# Patient Record
Sex: MALE | Employment: FULL TIME | URBAN - METROPOLITAN AREA
[De-identification: names, ages, dates, MRNs, and addresses within clinical notes are randomized per-mention and may not be internally consistent; named-entity substitution may affect disease eponyms.]

---

## 2017-01-16 ENCOUNTER — MED REC SCAN ONLY (OUTPATIENT)
Dept: FAMILY MEDICINE CLINIC | Facility: CLINIC | Age: 37
End: 2017-01-16

## 2017-02-13 NOTE — PROGRESS NOTES
Heavy smoker wishes to retry Chantix, a drug that he has used successfully in the past.  Wife is not a smoker. Patient thankfully has not had complications of smoking as yet. Denies depressive illnesses.   Simply describes colorful dreams while he was on

## 2017-02-21 ENCOUNTER — TELEPHONE (OUTPATIENT)
Dept: FAMILY MEDICINE CLINIC | Facility: CLINIC | Age: 37
End: 2017-02-21

## 2017-02-22 ENCOUNTER — LAB ENCOUNTER (OUTPATIENT)
Dept: LAB | Age: 37
End: 2017-02-22
Attending: FAMILY MEDICINE
Payer: COMMERCIAL

## 2017-02-22 ENCOUNTER — OFFICE VISIT (OUTPATIENT)
Dept: FAMILY MEDICINE CLINIC | Facility: CLINIC | Age: 37
End: 2017-02-22

## 2017-02-22 ENCOUNTER — MED REC SCAN ONLY (OUTPATIENT)
Dept: FAMILY MEDICINE CLINIC | Facility: CLINIC | Age: 37
End: 2017-02-22

## 2017-02-22 VITALS
HEART RATE: 78 BPM | RESPIRATION RATE: 16 BRPM | BODY MASS INDEX: 30.07 KG/M2 | DIASTOLIC BLOOD PRESSURE: 76 MMHG | TEMPERATURE: 98 F | WEIGHT: 222 LBS | SYSTOLIC BLOOD PRESSURE: 112 MMHG | HEIGHT: 72 IN

## 2017-02-22 DIAGNOSIS — M54.16 LUMBAR RADICULOPATHY: ICD-10-CM

## 2017-02-22 DIAGNOSIS — Z01.818 PRE-OP EXAM: ICD-10-CM

## 2017-02-22 DIAGNOSIS — Z01.818 PRE-OP EXAM: Primary | ICD-10-CM

## 2017-02-22 DIAGNOSIS — Z01.818 PREOP EXAMINATION: Primary | ICD-10-CM

## 2017-02-22 LAB
ALBUMIN SERPL-MCNC: 3.8 G/DL (ref 3.5–4.8)
ALP LIVER SERPL-CCNC: 60 U/L (ref 45–117)
ALT SERPL-CCNC: 25 U/L (ref 17–63)
APTT PPP: 27.7 SECONDS (ref 25–34)
AST SERPL-CCNC: 19 U/L (ref 15–41)
BASOPHILS # BLD AUTO: 0.07 X10(3) UL (ref 0–0.1)
BASOPHILS NFR BLD AUTO: 1.1 %
BILIRUB SERPL-MCNC: 0.5 MG/DL (ref 0.1–2)
BUN BLD-MCNC: 11 MG/DL (ref 8–20)
CALCIUM BLD-MCNC: 9.4 MG/DL (ref 8.3–10.3)
CHLORIDE: 108 MMOL/L (ref 101–111)
CO2: 31 MMOL/L (ref 22–32)
CREAT BLD-MCNC: 0.86 MG/DL (ref 0.7–1.3)
EOSINOPHIL # BLD AUTO: 0.53 X10(3) UL (ref 0–0.3)
EOSINOPHIL NFR BLD AUTO: 8.1 %
ERYTHROCYTE [DISTWIDTH] IN BLOOD BY AUTOMATED COUNT: 12.3 % (ref 11.5–16)
GLUCOSE BLD-MCNC: 87 MG/DL (ref 70–99)
HCT VFR BLD AUTO: 46.8 % (ref 37–53)
HGB BLD-MCNC: 16 G/DL (ref 13–17)
IMMATURE GRANULOCYTE COUNT: 0.01 X10(3) UL (ref 0–1)
IMMATURE GRANULOCYTE RATIO %: 0.2 %
INR BLD: 0.93 (ref 0.89–1.12)
LYMPHOCYTES # BLD AUTO: 1.12 X10(3) UL (ref 0.9–4)
LYMPHOCYTES NFR BLD AUTO: 17.1 %
M PROTEIN MFR SERPL ELPH: 7.2 G/DL (ref 6.1–8.3)
MCH RBC QN AUTO: 33.4 PG (ref 27–33.2)
MCHC RBC AUTO-ENTMCNC: 34.2 G/DL (ref 31–37)
MCV RBC AUTO: 97.7 FL (ref 80–99)
MONOCYTES # BLD AUTO: 0.66 X10(3) UL (ref 0.1–0.6)
MONOCYTES NFR BLD AUTO: 10.1 %
NEUTROPHIL ABS PRELIM: 4.15 X10 (3) UL (ref 1.3–6.7)
NEUTROPHILS # BLD AUTO: 4.15 X10(3) UL (ref 1.3–6.7)
NEUTROPHILS NFR BLD AUTO: 63.4 %
PLATELET # BLD AUTO: 173 10(3)UL (ref 150–450)
POTASSIUM SERPL-SCNC: 4.7 MMOL/L (ref 3.6–5.1)
PSA SERPL DL<=0.01 NG/ML-MCNC: 12.7 SECONDS (ref 12.3–14.8)
RBC # BLD AUTO: 4.79 X10(6)UL (ref 4.3–5.7)
RED CELL DISTRIBUTION WIDTH-SD: 44.3 FL (ref 35.1–46.3)
SODIUM SERPL-SCNC: 142 MMOL/L (ref 136–144)
WBC # BLD AUTO: 6.5 X10(3) UL (ref 4–13)

## 2017-02-22 PROCEDURE — 85025 COMPLETE CBC W/AUTO DIFF WBC: CPT

## 2017-02-22 PROCEDURE — 36415 COLL VENOUS BLD VENIPUNCTURE: CPT

## 2017-02-22 PROCEDURE — 85730 THROMBOPLASTIN TIME PARTIAL: CPT

## 2017-02-22 PROCEDURE — 99243 OFF/OP CNSLTJ NEW/EST LOW 30: CPT | Performed by: FAMILY MEDICINE

## 2017-02-22 PROCEDURE — 85610 PROTHROMBIN TIME: CPT

## 2017-02-22 PROCEDURE — 93000 ELECTROCARDIOGRAM COMPLETE: CPT | Performed by: FAMILY MEDICINE

## 2017-02-22 PROCEDURE — 80053 COMPREHEN METABOLIC PANEL: CPT

## 2017-02-22 NOTE — PROGRESS NOTES
HPI:    Patient ID: Coco Norman is a 42year old male. HPI Comments: Pt is here for preop clearance requested by Dr. Leyla An. He is having microdiscectomy with Dr. Leyla An on 3/3/17. Due to left lumbar radiculopathy.     Past Medical History:    Derm breath sounds normal. No respiratory distress. He has no wheezes. He has no rales. Abdominal: Soft. Bowel sounds are normal. He exhibits no distension. There is no tenderness. Lymphadenopathy:     He has no cervical adenopathy.    Neurological: He is al

## 2017-02-27 ENCOUNTER — TELEPHONE (OUTPATIENT)
Dept: FAMILY MEDICINE CLINIC | Facility: CLINIC | Age: 37
End: 2017-02-27

## 2017-03-14 RX ORDER — VARENICLINE TARTRATE 0.5 (11)-1
KIT ORAL
Qty: 1 PACKAGE | Refills: 0 | Status: SHIPPED | OUTPATIENT
Start: 2017-03-14 | End: 2017-04-12

## 2017-04-01 ENCOUNTER — MED REC SCAN ONLY (OUTPATIENT)
Dept: FAMILY MEDICINE CLINIC | Facility: CLINIC | Age: 37
End: 2017-04-01

## 2017-04-11 RX ORDER — VARENICLINE TARTRATE 0.5 (11)-1
KIT ORAL
Qty: 1 PACKAGE | Refills: 0 | Status: SHIPPED | OUTPATIENT
Start: 2017-04-11 | End: 2018-07-23 | Stop reason: ALTCHOICE

## 2017-04-12 ENCOUNTER — TELEPHONE (OUTPATIENT)
Dept: FAMILY MEDICINE CLINIC | Facility: CLINIC | Age: 37
End: 2017-04-12

## 2017-04-12 RX ORDER — VARENICLINE TARTRATE 1 MG/1
1 TABLET, FILM COATED ORAL 2 TIMES DAILY
Qty: 60 TABLET | Refills: 0 | Status: SHIPPED | OUTPATIENT
Start: 2017-04-12 | End: 2017-05-10

## 2017-04-12 RX ORDER — VARENICLINE TARTRATE 1 MG/1
1 TABLET, FILM COATED ORAL 2 TIMES DAILY
Qty: 60 TABLET | Refills: 0 | Status: SHIPPED | OUTPATIENT
Start: 2017-04-12 | End: 2017-04-12

## 2017-04-12 RX ORDER — VARENICLINE TARTRATE 0.5 (11)-1
KIT ORAL
Qty: 53 TABLET | Refills: 0 | Status: SHIPPED | OUTPATIENT
Start: 2017-04-12 | End: 2017-04-12 | Stop reason: CLARIF

## 2017-05-02 ENCOUNTER — MED REC SCAN ONLY (OUTPATIENT)
Dept: FAMILY MEDICINE CLINIC | Facility: CLINIC | Age: 37
End: 2017-05-02

## 2017-05-04 ENCOUNTER — TELEPHONE (OUTPATIENT)
Dept: FAMILY MEDICINE CLINIC | Facility: CLINIC | Age: 37
End: 2017-05-04

## 2017-05-10 RX ORDER — VARENICLINE TARTRATE 1 MG/1
TABLET, FILM COATED ORAL
Qty: 60 TABLET | Refills: 0 | Status: SHIPPED | OUTPATIENT
Start: 2017-05-10 | End: 2017-06-06

## 2017-06-06 RX ORDER — VARENICLINE TARTRATE 1 MG/1
TABLET, FILM COATED ORAL
Qty: 60 TABLET | Refills: 0 | Status: SHIPPED | OUTPATIENT
Start: 2017-06-06 | End: 2017-07-03

## 2017-07-03 RX ORDER — VARENICLINE TARTRATE 1 MG/1
TABLET, FILM COATED ORAL
Qty: 60 TABLET | Refills: 0 | Status: SHIPPED | OUTPATIENT
Start: 2017-07-03 | End: 2017-08-04

## 2017-08-07 RX ORDER — VARENICLINE TARTRATE 1 MG/1
TABLET, FILM COATED ORAL
Qty: 60 TABLET | Refills: 0 | Status: SHIPPED | OUTPATIENT
Start: 2017-08-07 | End: 2017-09-15

## 2017-09-15 RX ORDER — VARENICLINE TARTRATE 1 MG/1
TABLET, FILM COATED ORAL
Qty: 60 TABLET | Refills: 0 | OUTPATIENT
Start: 2017-09-15

## 2017-09-18 RX ORDER — VARENICLINE TARTRATE 1 MG/1
TABLET, FILM COATED ORAL
Qty: 60 TABLET | Refills: 0 | Status: SHIPPED | OUTPATIENT
Start: 2017-09-18 | End: 2018-04-02

## 2017-09-19 RX ORDER — VARENICLINE TARTRATE 1 MG/1
TABLET, FILM COATED ORAL
Qty: 60 TABLET | Refills: 0
Start: 2017-09-19

## 2017-09-19 NOTE — TELEPHONE ENCOUNTER
From: Carolyn Miranda  Sent: 9/15/2017 8:18 PM CDT  Subject: Medication Renewal Request    Carolyn Miranda would like a refill of the following medications:     CHANTIX 1 MG Oral Tab Jaimie Deshpande DO]    Preferred pharmacy: [address] [place] [address], [address] [place], [place] [address], [phone]    [facility]

## 2017-10-26 RX ORDER — TADALAFIL 20 MG
TABLET ORAL
Qty: 8 TABLET | Refills: 0 | Status: SHIPPED | OUTPATIENT
Start: 2017-10-26 | End: 2017-11-25

## 2017-11-14 ENCOUNTER — TELEPHONE (OUTPATIENT)
Dept: FAMILY MEDICINE CLINIC | Facility: CLINIC | Age: 37
End: 2017-11-14

## 2017-11-27 RX ORDER — TADALAFIL 20 MG
TABLET ORAL
Qty: 8 TABLET | Refills: 0 | Status: SHIPPED | OUTPATIENT
Start: 2017-11-27 | End: 2017-12-26

## 2017-12-27 RX ORDER — TADALAFIL 20 MG
TABLET ORAL
Qty: 8 TABLET | Refills: 0 | Status: SHIPPED | OUTPATIENT
Start: 2017-12-27 | End: 2018-04-20

## 2018-02-28 ENCOUNTER — TELEPHONE (OUTPATIENT)
Dept: FAMILY MEDICINE CLINIC | Facility: CLINIC | Age: 38
End: 2018-02-28

## 2018-02-28 NOTE — TELEPHONE ENCOUNTER
Pt called pharmacy for chantix refill but was advised script # was no good.   [facility] [address] [facility] [place]

## 2018-03-16 ENCOUNTER — TELEPHONE (OUTPATIENT)
Dept: FAMILY MEDICINE CLINIC | Facility: CLINIC | Age: 38
End: 2018-03-16

## 2018-03-16 NOTE — TELEPHONE ENCOUNTER
Medical record request.  Send to [facility]. Med Rec from 11/1/15 to present. Sent to [facility] 3/16/2018. Copy sent to scan.

## 2018-04-02 RX ORDER — VARENICLINE TARTRATE 1 MG/1
TABLET, FILM COATED ORAL
Qty: 60 TABLET | Refills: 0 | Status: SHIPPED | OUTPATIENT
Start: 2018-04-02 | End: 2020-03-26

## 2018-04-21 RX ORDER — TADALAFIL 20 MG
TABLET ORAL
Qty: 8 TABLET | Refills: 0 | Status: SHIPPED | OUTPATIENT
Start: 2018-04-21 | End: 2018-07-23

## 2018-04-21 NOTE — TELEPHONE ENCOUNTER
Last ov was 2/21/18  Last refill Cialis 12/27/17        Pt is due for a f/u on his medication. No further refills will be made without an office visit.  Please call to schedule a f/u

## 2018-05-01 RX ORDER — VARENICLINE TARTRATE 1 MG/1
TABLET, FILM COATED ORAL
Qty: 60 TABLET | Refills: 0 | OUTPATIENT
Start: 2018-05-01

## 2018-05-17 RX ORDER — VARENICLINE TARTRATE 1 MG/1
TABLET, FILM COATED ORAL
Qty: 60 TABLET | Refills: 0 | OUTPATIENT
Start: 2018-05-17

## 2018-06-06 RX ORDER — TADALAFIL 20 MG/1
TABLET ORAL
Qty: 8 TABLET | Refills: 0
Start: 2018-06-06

## 2018-06-06 NOTE — TELEPHONE ENCOUNTER
From: Janet Tineo  Sent: 6/5/2018 6:05 PM CDT  Subject: Medication Renewal Request    Janet Tineo would like a refill of the following medications:     CIALIS 20 MG Oral Tab Tigre Jesus, ]    Preferred pharmacy: [facility] [place] [address]

## 2018-07-23 ENCOUNTER — OFFICE VISIT (OUTPATIENT)
Dept: FAMILY MEDICINE CLINIC | Facility: CLINIC | Age: 38
End: 2018-07-23
Payer: COMMERCIAL

## 2018-07-23 VITALS
HEART RATE: 70 BPM | SYSTOLIC BLOOD PRESSURE: 112 MMHG | TEMPERATURE: 99 F | RESPIRATION RATE: 16 BRPM | WEIGHT: 234 LBS | HEIGHT: 72 IN | BODY MASS INDEX: 31.69 KG/M2 | OXYGEN SATURATION: 97 % | DIASTOLIC BLOOD PRESSURE: 82 MMHG

## 2018-07-23 DIAGNOSIS — Z00.00 ROUTINE GENERAL MEDICAL EXAMINATION AT HEALTH CARE FACILITY: ICD-10-CM

## 2018-07-23 DIAGNOSIS — M81.0 AGE-RELATED OSTEOPOROSIS WITHOUT CURRENT PATHOLOGICAL FRACTURE: Primary | ICD-10-CM

## 2018-07-23 DIAGNOSIS — G35 MULTIPLE SCLEROSIS (HCC): ICD-10-CM

## 2018-07-23 PROCEDURE — 99396 PREV VISIT EST AGE 40-64: CPT | Performed by: FAMILY MEDICINE

## 2018-07-23 RX ORDER — PAROXETINE HYDROCHLORIDE 20 MG/1
20 TABLET, FILM COATED ORAL
COMMUNITY
End: 2019-10-01 | Stop reason: ALTCHOICE

## 2018-07-23 RX ORDER — TADALAFIL 20 MG/1
TABLET ORAL
Qty: 8 TABLET | Refills: 0 | Status: SHIPPED | OUTPATIENT
Start: 2018-07-23 | End: 2018-08-28

## 2018-07-23 RX ORDER — DIAZEPAM 5 MG/1
TABLET ORAL
Refills: 1 | COMMUNITY
Start: 2018-05-21 | End: 2021-02-09

## 2018-07-23 RX ORDER — PANTOPRAZOLE SODIUM 40 MG/1
40 TABLET, DELAYED RELEASE ORAL
COMMUNITY
Start: 2018-07-10 | End: 2019-10-01 | Stop reason: ALTCHOICE

## 2018-07-23 RX ORDER — VENLAFAXINE HYDROCHLORIDE 37.5 MG/1
37.5 CAPSULE, EXTENDED RELEASE ORAL
Refills: 1 | COMMUNITY
Start: 2018-07-17 | End: 2021-02-09 | Stop reason: DRUGHIGH

## 2018-07-23 NOTE — PROGRESS NOTES
Here for physical.  Elsa Tracey was diagnosed with multiple sclerosis at least 5 years ago. He was evaluated recently in the ER for abdominal pain and was found that he had gallbladder sludge.   Surgery was not performed because of recent steroid use and ris again in 4-5 months thank you

## 2018-08-07 ENCOUNTER — APPOINTMENT (OUTPATIENT)
Dept: LAB | Age: 38
End: 2018-08-07
Attending: FAMILY MEDICINE
Payer: COMMERCIAL

## 2018-08-07 ENCOUNTER — HOSPITAL ENCOUNTER (OUTPATIENT)
Dept: BONE DENSITY | Age: 38
Discharge: HOME | End: 2018-08-07
Attending: FAMILY MEDICINE
Payer: COMMERCIAL

## 2018-08-07 DIAGNOSIS — M81.0 AGE-RELATED OSTEOPOROSIS WITHOUT CURRENT PATHOLOGICAL FRACTURE: ICD-10-CM

## 2018-08-07 LAB — VIT D+METAB SERPL-MCNC: 28.7 NG/ML (ref 30–100)

## 2018-08-07 PROCEDURE — 82306 VITAMIN D 25 HYDROXY: CPT | Performed by: FAMILY MEDICINE

## 2018-08-07 PROCEDURE — 36415 COLL VENOUS BLD VENIPUNCTURE: CPT | Performed by: FAMILY MEDICINE

## 2018-08-07 PROCEDURE — 77080 DXA BONE DENSITY AXIAL: CPT | Performed by: FAMILY MEDICINE

## 2018-08-28 RX ORDER — TADALAFIL 20 MG/1
TABLET ORAL
Qty: 8 TABLET | Refills: 0
Start: 2018-08-28

## 2018-08-29 RX ORDER — TADALAFIL 20 MG/1
TABLET ORAL
Qty: 8 TABLET | Refills: 0 | Status: SHIPPED
Start: 2018-08-29 | End: 2018-10-17

## 2018-08-29 NOTE — TELEPHONE ENCOUNTER
From: Kalee Coughlin  Sent: 8/28/2018 6:00 PM CDT  Subject: Medication Renewal Request    Kalee Coughlin would like a refill of the following medications:      Tadalafil (CIALIS) 20 MG Oral Tab Luis Manuel Fatima DO]    Preferred pharmacy: [id number] - [place]

## 2018-10-17 RX ORDER — TADALAFIL 20 MG/1
TABLET ORAL
Qty: 8 TABLET | Refills: 0 | Status: SHIPPED | OUTPATIENT
Start: 2018-10-17 | End: 2018-12-18

## 2018-10-18 RX ORDER — TADALAFIL 20 MG/1
TABLET ORAL
Qty: 8 TABLET | Refills: 0 | OUTPATIENT
Start: 2018-10-18

## 2018-12-18 RX ORDER — TADALAFIL 20 MG/1
TABLET ORAL
Qty: 8 TABLET | Refills: 0 | Status: SHIPPED | OUTPATIENT
Start: 2018-12-18 | End: 2019-07-09

## 2018-12-18 RX ORDER — TADALAFIL 20 MG/1
TABLET ORAL
Qty: 8 TABLET | Refills: 0 | Status: SHIPPED | OUTPATIENT
Start: 2018-12-18 | End: 2019-04-04

## 2019-03-01 ENCOUNTER — TELEPHONE (OUTPATIENT)
Dept: FAMILY MEDICINE CLINIC | Facility: CLINIC | Age: 39
End: 2019-03-01

## 2019-03-01 NOTE — TELEPHONE ENCOUNTER
MEDICAL RECORDS FOR DISABILITY BENEFITS FROM 11/1/15 TO PRESENT SENT TO:    [facility]  [address]  [address]  [place] [address]    REQUEST SENT TO [address] [place] [address] STAT 3/1/19

## 2019-04-04 RX ORDER — TADALAFIL 20 MG/1
TABLET ORAL
Qty: 8 TABLET | Refills: 0 | Status: SHIPPED | OUTPATIENT
Start: 2019-04-04 | End: 2019-07-09

## 2019-04-05 RX ORDER — TADALAFIL 20 MG/1
TABLET ORAL
Qty: 8 TABLET | Refills: 0 | OUTPATIENT
Start: 2019-04-05

## 2019-07-09 RX ORDER — TADALAFIL 20 MG/1
TABLET ORAL
Qty: 8 TABLET | Refills: 0 | Status: SHIPPED | OUTPATIENT
Start: 2019-07-09 | End: 2019-08-19

## 2019-07-09 NOTE — TELEPHONE ENCOUNTER
Last ov 7/23/18  Last refill 4/4/19    . Pt is due for a physical on or after 7/23/19 Please call to schedule one

## 2019-08-19 RX ORDER — TADALAFIL 20 MG/1
TABLET ORAL
Qty: 8 TABLET | Refills: 0 | Status: SHIPPED | OUTPATIENT
Start: 2019-08-19 | End: 2019-10-01

## 2019-08-19 NOTE — TELEPHONE ENCOUNTER
Last visit 7/23/2018  Last refill 07/9/2019    Please call patient and have them make a appt for future refills

## 2019-09-18 RX ORDER — TADALAFIL 20 MG/1
TABLET ORAL
Qty: 8 TABLET | Refills: 0 | OUTPATIENT
Start: 2019-09-18

## 2019-09-23 RX ORDER — TADALAFIL 20 MG/1
TABLET ORAL
Qty: 8 TABLET | Refills: 0 | OUTPATIENT
Start: 2019-09-23

## 2019-10-01 NOTE — PROGRESS NOTES
Here to renew 2 different medications one is Cialis which has been very effective for him the other is to renew Chantix, a medication that has helped in the past.  He has had no other complications and denies history of depression.     Exam mental status is

## 2020-01-28 RX ORDER — TADALAFIL 20 MG/1
TABLET ORAL
Qty: 8 TABLET | Refills: 11 | Status: SHIPPED | OUTPATIENT
Start: 2020-01-28 | End: 2020-09-18

## 2020-03-26 RX ORDER — VARENICLINE TARTRATE 1 MG/1
TABLET, FILM COATED ORAL
Qty: 180 TABLET | Refills: 2 | Status: SHIPPED | OUTPATIENT
Start: 2020-03-26 | End: 2020-09-18

## 2020-07-22 NOTE — TELEPHONE ENCOUNTER
[facility], they have [facility] that is still good, they will call pt. Task completed. What Is The Reason For Today's Visit?: History of Non-Melanoma Skin Cancer How Many Skin Cancers Have You Had?: more than one When Was Your Last Cancer Diagnosed?: 2019

## 2020-09-18 DIAGNOSIS — Z00.00 LABORATORY EXAM ORDERED AS PART OF ROUTINE GENERAL MEDICAL EXAMINATION: Primary | ICD-10-CM

## 2020-09-18 DIAGNOSIS — E55.9 HYPOVITAMINOSIS D: ICD-10-CM

## 2020-09-18 DIAGNOSIS — Z12.5 ENCOUNTER FOR SCREENING FOR MALIGNANT NEOPLASM OF PROSTATE: ICD-10-CM

## 2020-09-18 RX ORDER — TADALAFIL 20 MG/1
TABLET ORAL
Qty: 8 TABLET | Refills: 2 | Status: SHIPPED | OUTPATIENT
Start: 2020-09-18 | End: 2021-02-09

## 2020-09-18 RX ORDER — VARENICLINE TARTRATE 1 MG/1
1 TABLET, FILM COATED ORAL 2 TIMES DAILY
Qty: 180 TABLET | Refills: 0 | Status: SHIPPED | OUTPATIENT
Start: 2020-09-18

## 2020-11-18 RX ORDER — TADALAFIL 20 MG/1
TABLET ORAL
Qty: 8 TABLET | Refills: 2 | OUTPATIENT
Start: 2020-11-18

## 2021-01-21 ENCOUNTER — TELEPHONE (OUTPATIENT)
Dept: FAMILY MEDICINE CLINIC | Facility: CLINIC | Age: 41
End: 2021-01-21

## 2021-02-09 ENCOUNTER — OFFICE VISIT (OUTPATIENT)
Dept: FAMILY MEDICINE CLINIC | Facility: CLINIC | Age: 41
End: 2021-02-09
Payer: MEDICAID

## 2021-02-09 VITALS
RESPIRATION RATE: 18 BRPM | BODY MASS INDEX: 35.49 KG/M2 | DIASTOLIC BLOOD PRESSURE: 88 MMHG | HEIGHT: 72 IN | OXYGEN SATURATION: 96 % | WEIGHT: 262 LBS | SYSTOLIC BLOOD PRESSURE: 132 MMHG | HEART RATE: 78 BPM

## 2021-02-09 DIAGNOSIS — M62.838 MUSCLE SPASM: Primary | ICD-10-CM

## 2021-02-09 DIAGNOSIS — G25.81 RESTLESS LEG: ICD-10-CM

## 2021-02-09 DIAGNOSIS — N52.9 ORGANIC ERECTILE DYSFUNCTION: ICD-10-CM

## 2021-02-09 DIAGNOSIS — M62.838 MUSCLE SPASM: ICD-10-CM

## 2021-02-09 DIAGNOSIS — G35 MULTIPLE SCLEROSIS (HCC): ICD-10-CM

## 2021-02-09 PROCEDURE — 99213 OFFICE O/P EST LOW 20 MIN: CPT | Performed by: FAMILY MEDICINE

## 2021-02-09 PROCEDURE — 3008F BODY MASS INDEX DOCD: CPT | Performed by: FAMILY MEDICINE

## 2021-02-09 PROCEDURE — 3079F DIAST BP 80-89 MM HG: CPT | Performed by: FAMILY MEDICINE

## 2021-02-09 PROCEDURE — 3075F SYST BP GE 130 - 139MM HG: CPT | Performed by: FAMILY MEDICINE

## 2021-02-09 RX ORDER — TADALAFIL 20 MG/1
TABLET ORAL
Qty: 8 TABLET | Refills: 2 | OUTPATIENT
Start: 2021-02-09

## 2021-02-09 RX ORDER — TADALAFIL 20 MG/1
TABLET ORAL
Qty: 8 TABLET | Refills: 2 | Status: SHIPPED | OUTPATIENT
Start: 2021-02-09 | End: 2021-04-21

## 2021-02-09 RX ORDER — DIAZEPAM 5 MG/1
5 TABLET ORAL EVERY 6 HOURS PRN
Qty: 30 TABLET | Refills: 1 | OUTPATIENT
Start: 2021-02-09

## 2021-02-09 RX ORDER — DIAZEPAM 5 MG/1
5 TABLET ORAL EVERY 6 HOURS PRN
Qty: 30 TABLET | Refills: 1 | Status: SHIPPED | OUTPATIENT
Start: 2021-02-09 | End: 2021-03-25

## 2021-02-15 ENCOUNTER — TELEPHONE (OUTPATIENT)
Dept: FAMILY MEDICINE CLINIC | Facility: CLINIC | Age: 41
End: 2021-02-15

## 2021-03-25 DIAGNOSIS — M62.838 MUSCLE SPASM: ICD-10-CM

## 2021-03-25 DIAGNOSIS — G35 MULTIPLE SCLEROSIS (HCC): ICD-10-CM

## 2021-03-25 DIAGNOSIS — G25.81 RESTLESS LEG: ICD-10-CM

## 2021-03-26 RX ORDER — DIAZEPAM 5 MG/1
5 TABLET ORAL EVERY 6 HOURS PRN
Qty: 30 TABLET | Refills: 1 | Status: SHIPPED | OUTPATIENT
Start: 2021-03-26 | End: 2021-11-02

## 2021-04-21 ENCOUNTER — OFFICE VISIT (OUTPATIENT)
Dept: FAMILY MEDICINE CLINIC | Facility: CLINIC | Age: 41
End: 2021-04-21
Payer: MEDICAID

## 2021-04-21 VITALS
SYSTOLIC BLOOD PRESSURE: 128 MMHG | DIASTOLIC BLOOD PRESSURE: 88 MMHG | BODY MASS INDEX: 35.76 KG/M2 | HEIGHT: 72 IN | OXYGEN SATURATION: 98 % | RESPIRATION RATE: 16 BRPM | HEART RATE: 74 BPM | WEIGHT: 264 LBS

## 2021-04-21 DIAGNOSIS — N52.9 ORGANIC ERECTILE DYSFUNCTION: ICD-10-CM

## 2021-04-21 DIAGNOSIS — M21.611 BUNION OF GREAT TOE OF RIGHT FOOT: ICD-10-CM

## 2021-04-21 DIAGNOSIS — M54.42 CHRONIC RIGHT-SIDED LOW BACK PAIN WITH BILATERAL SCIATICA: ICD-10-CM

## 2021-04-21 DIAGNOSIS — M54.41 CHRONIC RIGHT-SIDED LOW BACK PAIN WITH BILATERAL SCIATICA: ICD-10-CM

## 2021-04-21 DIAGNOSIS — R06.83 SNORING: Primary | ICD-10-CM

## 2021-04-21 DIAGNOSIS — G47.19 EXCESSIVE DAYTIME SLEEPINESS: ICD-10-CM

## 2021-04-21 DIAGNOSIS — G89.29 CHRONIC RIGHT-SIDED LOW BACK PAIN WITH BILATERAL SCIATICA: ICD-10-CM

## 2021-04-21 PROCEDURE — 3008F BODY MASS INDEX DOCD: CPT | Performed by: FAMILY MEDICINE

## 2021-04-21 PROCEDURE — 3079F DIAST BP 80-89 MM HG: CPT | Performed by: FAMILY MEDICINE

## 2021-04-21 PROCEDURE — 99214 OFFICE O/P EST MOD 30 MIN: CPT | Performed by: FAMILY MEDICINE

## 2021-04-21 PROCEDURE — 3074F SYST BP LT 130 MM HG: CPT | Performed by: FAMILY MEDICINE

## 2021-04-21 RX ORDER — TADALAFIL 20 MG/1
TABLET ORAL
Qty: 30 TABLET | Refills: 2 | Status: SHIPPED | OUTPATIENT
Start: 2021-04-21 | End: 2021-09-10

## 2021-04-21 NOTE — PROGRESS NOTES
[facility] Progress Note    SUBJECTIVE: Sanju Mejia 35year old male is here today for Patient presents with:  Obstructive Sleep Apnea (VITA): snoring  Foot Pain: Rt foot x 1 year, getting worse      Snoring, has been snoring for quite a while, ha • PARoxetine HCl 30 MG Oral Tab Take 30 mg by mouth every morning. • Ocrelizumab (OCREVUS IV) Inject into the vein.      • AUBAGIO 14 MG Oral Tab      • Varenicline Tartrate (CHANTIX) 1 MG Oral Tab Take 1 tablet (1 mg total) by mouth 2 (two) times nasir

## 2021-05-04 ENCOUNTER — OFFICE VISIT (OUTPATIENT)
Dept: ORTHOPEDICS CLINIC | Facility: CLINIC | Age: 41
End: 2021-05-04
Payer: MEDICAID

## 2021-05-04 ENCOUNTER — HOSPITAL ENCOUNTER (OUTPATIENT)
Dept: GENERAL RADIOLOGY | Age: 41
Discharge: HOME | End: 2021-05-04
Attending: PODIATRIST
Payer: MEDICAID

## 2021-05-04 DIAGNOSIS — R26.9 GAIT ABNORMALITY: ICD-10-CM

## 2021-05-04 DIAGNOSIS — M20.21 HALLUX RIGIDUS OF RIGHT FOOT: Primary | ICD-10-CM

## 2021-05-04 DIAGNOSIS — M21.611 BUNION OF RIGHT FOOT: ICD-10-CM

## 2021-05-04 PROCEDURE — 73630 X-RAY EXAM OF FOOT: CPT | Performed by: PODIATRIST

## 2021-05-04 PROCEDURE — 99203 OFFICE O/P NEW LOW 30 MIN: CPT | Performed by: PODIATRIST

## 2021-05-04 NOTE — PROGRESS NOTES
[facility] New Patient Note    CC: Patient presents with: Foot Pain: Patient is here today for right foot bunion. Patient states that the pain has increased the last few years.       HPI: The patient is a 35year old male who presents today wit except as mentioned above. Physical Exam:    There were no vitals taken for this visit. Neurovascular status is intact distally. Foot swelling, enlarged right first mpj, loss of motion with abundant spurring and palpable tenderness. Leanora Dunker   He only has abou

## 2021-05-05 ENCOUNTER — TELEPHONE (OUTPATIENT)
Dept: ORTHOPEDICS CLINIC | Facility: CLINIC | Age: 41
End: 2021-05-05

## 2021-05-05 NOTE — TELEPHONE ENCOUNTER
Medicaid does not cover DME. Patient will be responsible for 855.00 for orthotics through.  Please schedule and notify patient of 855.00 at time of appointment

## 2021-05-12 ENCOUNTER — OFFICE VISIT (OUTPATIENT)
Dept: SLEEP CENTER | Age: 41
End: 2021-05-12
Attending: INTERNAL MEDICINE
Payer: MEDICAID

## 2021-05-12 DIAGNOSIS — G47.19 EXCESSIVE DAYTIME SLEEPINESS: ICD-10-CM

## 2021-05-12 DIAGNOSIS — R06.83 SNORING: ICD-10-CM

## 2021-05-12 PROCEDURE — 95810 POLYSOM 6/> YRS 4/> PARAM: CPT

## 2021-06-30 ENCOUNTER — TELEPHONE (OUTPATIENT)
Dept: ORTHOPEDICS CLINIC | Facility: CLINIC | Age: 41
End: 2021-06-30

## 2021-06-30 NOTE — TELEPHONE ENCOUNTER
Left message for pt to call back to update insurance. Checked insurance on [facility] and he is ineligible. Please update and route back to supervisor when complete.

## 2021-07-02 NOTE — TELEPHONE ENCOUNTER
LMOM asking patient to contact our office back to obtain new insurance information. [employer] medicaid is coming back as ineligible.

## 2021-07-06 ENCOUNTER — TELEPHONE (OUTPATIENT)
Dept: ORTHOPEDICS CLINIC | Facility: CLINIC | Age: 41
End: 2021-07-06

## 2021-07-06 NOTE — TELEPHONE ENCOUNTER
Patient left a message on [employer] on Saturday, 7/3/21 at 12pm returning your call regarding insurance. Please call him back.

## 2021-07-07 NOTE — TELEPHONE ENCOUNTER
Patient returned call, gave me new state medicaid insurance. Unable to verify due to [employer] stating \"Request cannot be processed. Try again later \". Insurance entered.

## 2021-07-16 ENCOUNTER — ORDER TRANSCRIPTION (OUTPATIENT)
Dept: SLEEP CENTER | Age: 41
End: 2021-07-16

## 2021-07-16 DIAGNOSIS — Z01.818 PREOP EXAMINATION: Primary | ICD-10-CM

## 2021-07-16 DIAGNOSIS — Z11.59 SCREENING FOR VIRAL DISEASE: ICD-10-CM

## 2021-07-20 ENCOUNTER — LAB ENCOUNTER (OUTPATIENT)
Dept: LAB | Age: 41
End: 2021-07-20
Attending: INTERNAL MEDICINE
Payer: MEDICAID

## 2021-07-20 DIAGNOSIS — Z01.818 PREOP EXAMINATION: ICD-10-CM

## 2021-07-20 DIAGNOSIS — Z11.59 SCREENING FOR VIRAL DISEASE: ICD-10-CM

## 2021-07-21 LAB — SARS-COV-2 RNA RESP QL NAA+PROBE: NOT DETECTED

## 2021-08-11 ENCOUNTER — LAB ENCOUNTER (OUTPATIENT)
Dept: LAB | Age: 41
End: 2021-08-11
Attending: INTERNAL MEDICINE
Payer: MEDICAID

## 2021-08-11 DIAGNOSIS — Z11.59 SCREENING FOR VIRAL DISEASE: ICD-10-CM

## 2021-08-11 DIAGNOSIS — Z01.818 PREOP EXAMINATION: ICD-10-CM

## 2021-08-12 LAB — SARS-COV-2 RNA RESP QL NAA+PROBE: NOT DETECTED

## 2021-08-14 ENCOUNTER — OFFICE VISIT (OUTPATIENT)
Dept: SLEEP CENTER | Age: 41
End: 2021-08-14
Attending: INTERNAL MEDICINE
Payer: MEDICAID

## 2021-08-14 DIAGNOSIS — G47.33 OSA (OBSTRUCTIVE SLEEP APNEA): ICD-10-CM

## 2021-08-14 PROCEDURE — 95811 POLYSOM 6/>YRS CPAP 4/> PARM: CPT

## 2021-09-10 DIAGNOSIS — N52.9 ORGANIC ERECTILE DYSFUNCTION: ICD-10-CM

## 2021-09-10 RX ORDER — TADALAFIL 20 MG/1
TABLET ORAL
Qty: 30 TABLET | Refills: 2 | Status: SHIPPED | OUTPATIENT
Start: 2021-09-10 | End: 2021-11-01

## 2021-11-01 DIAGNOSIS — N52.9 ORGANIC ERECTILE DYSFUNCTION: ICD-10-CM

## 2021-11-01 RX ORDER — TADALAFIL 20 MG/1
TABLET ORAL
Qty: 30 TABLET | Refills: 2 | Status: SHIPPED | OUTPATIENT
Start: 2021-11-01 | End: 2021-12-26

## 2021-11-02 DIAGNOSIS — G35 MULTIPLE SCLEROSIS (HCC): ICD-10-CM

## 2021-11-02 DIAGNOSIS — M62.838 MUSCLE SPASM: ICD-10-CM

## 2021-11-02 DIAGNOSIS — G25.81 RESTLESS LEG: ICD-10-CM

## 2021-11-02 RX ORDER — DIAZEPAM 5 MG/1
5 TABLET ORAL EVERY 6 HOURS PRN
Qty: 30 TABLET | Refills: 1 | Status: SHIPPED | OUTPATIENT
Start: 2021-11-02 | End: 2021-12-26

## 2021-12-26 DIAGNOSIS — G25.81 RESTLESS LEG: ICD-10-CM

## 2021-12-26 DIAGNOSIS — M62.838 MUSCLE SPASM: ICD-10-CM

## 2021-12-26 DIAGNOSIS — N52.9 ORGANIC ERECTILE DYSFUNCTION: ICD-10-CM

## 2021-12-26 DIAGNOSIS — G35 MULTIPLE SCLEROSIS (HCC): ICD-10-CM

## 2021-12-27 RX ORDER — DIAZEPAM 5 MG/1
5 TABLET ORAL EVERY 6 HOURS PRN
Qty: 30 TABLET | Refills: 1 | Status: SHIPPED | OUTPATIENT
Start: 2021-12-27

## 2021-12-27 RX ORDER — TADALAFIL 20 MG/1
TABLET ORAL
Qty: 30 TABLET | Refills: 2 | Status: SHIPPED | OUTPATIENT
Start: 2021-12-27

## 2022-03-01 RX ORDER — DIAZEPAM 5 MG/1
5 TABLET ORAL EVERY 6 HOURS PRN
Qty: 30 TABLET | Refills: 1 | Status: SHIPPED | OUTPATIENT
Start: 2022-03-01

## 2022-04-07 ENCOUNTER — HOSPITAL ENCOUNTER (OUTPATIENT)
Dept: MRI IMAGING | Age: 42
Discharge: HOME | End: 2022-04-07
Attending: SPECIALIST
Payer: COMMERCIAL

## 2022-04-07 DIAGNOSIS — G35 MULTIPLE SCLEROSIS (HCC): ICD-10-CM

## 2022-04-07 DIAGNOSIS — M48.02 CERVICAL SPINAL STENOSIS: ICD-10-CM

## 2022-04-07 PROCEDURE — A9575 INJ GADOTERATE MEGLUMI 0.1ML: HCPCS

## 2022-04-07 PROCEDURE — 70553 MRI BRAIN STEM W/O & W/DYE: CPT | Performed by: SPECIALIST

## 2022-04-07 PROCEDURE — 72156 MRI NECK SPINE W/O & W/DYE: CPT | Performed by: SPECIALIST

## 2022-04-07 RX ORDER — DIAZEPAM 5 MG/1
5 TABLET ORAL EVERY 6 HOURS PRN
Qty: 30 TABLET | Refills: 1 | Status: SHIPPED | OUTPATIENT
Start: 2022-04-07

## 2022-04-18 ENCOUNTER — TELEPHONE (OUTPATIENT)
Dept: FAMILY MEDICINE CLINIC | Facility: CLINIC | Age: 42
End: 2022-04-18

## 2022-04-18 NOTE — TELEPHONE ENCOUNTER
Pt's insurance is updated ([address] [place]).   Pt asking for referral asap.  pls advise

## 2022-04-18 NOTE — TELEPHONE ENCOUNTER
Pt no longer with Medicaid, pt will update his insurance on mychart, info given for Dr. Angie Weston. If referral needed, will need to enter after he updates insurance.  Pt will let us know

## 2022-04-18 NOTE — TELEPHONE ENCOUNTER
Pt was seen in immediate care yesterday. He has a hernia that he needs surgery in 2-3 days. He wants to know who  recommends.   He has [facility]

## 2022-04-21 ENCOUNTER — OFFICE VISIT (OUTPATIENT)
Dept: SURGERY | Facility: CLINIC | Age: 42
End: 2022-04-21
Payer: COMMERCIAL

## 2022-04-21 VITALS
TEMPERATURE: 97 F | DIASTOLIC BLOOD PRESSURE: 84 MMHG | BODY MASS INDEX: 33.13 KG/M2 | WEIGHT: 250 LBS | SYSTOLIC BLOOD PRESSURE: 124 MMHG | HEIGHT: 73 IN | HEART RATE: 69 BPM

## 2022-04-21 DIAGNOSIS — K59.01 SLOW TRANSIT CONSTIPATION: ICD-10-CM

## 2022-04-21 DIAGNOSIS — G47.33 OSA (OBSTRUCTIVE SLEEP APNEA): ICD-10-CM

## 2022-04-21 DIAGNOSIS — K42.0 UMBILICAL HERNIA, INCARCERATED: ICD-10-CM

## 2022-04-21 DIAGNOSIS — K40.90 UNILATERAL INGUINAL HERNIA WITHOUT OBSTRUCTION OR GANGRENE, RECURRENCE NOT SPECIFIED: Primary | ICD-10-CM

## 2022-04-21 PROCEDURE — 3008F BODY MASS INDEX DOCD: CPT | Performed by: COLON & RECTAL SURGERY

## 2022-04-21 PROCEDURE — 3079F DIAST BP 80-89 MM HG: CPT | Performed by: COLON & RECTAL SURGERY

## 2022-04-21 PROCEDURE — 3074F SYST BP LT 130 MM HG: CPT | Performed by: COLON & RECTAL SURGERY

## 2022-04-21 PROCEDURE — 99244 OFF/OP CNSLTJ NEW/EST MOD 40: CPT | Performed by: COLON & RECTAL SURGERY

## 2022-04-21 RX ORDER — GABAPENTIN 300 MG/1
300 CAPSULE ORAL 3 TIMES DAILY
COMMUNITY

## 2022-04-22 PROBLEM — K59.01 SLOW TRANSIT CONSTIPATION: Status: ACTIVE | Noted: 2022-04-22

## 2022-04-22 PROBLEM — K42.0 UMBILICAL HERNIA, INCARCERATED: Status: ACTIVE | Noted: 2022-04-22

## 2022-04-22 PROBLEM — K40.90 UNILATERAL INGUINAL HERNIA WITHOUT OBSTRUCTION OR GANGRENE: Status: ACTIVE | Noted: 2022-04-22

## 2022-04-22 NOTE — PATIENT INSTRUCTIONS
This patient presents with an umbilical hernia that is becoming significantly symptomatic. He gets 5/10 pain. It comes and goes. It is more noticeable with exercise and movements. He definitely sees a bulge. The pain is intermittent. It is described both as burning and stabbing. He does not have a specific event that brought about these problems. He has no difficulties with urinating. He does feel a change in bowel habits with some obstruction to defecation. He states he is constipated about half the time. He has no associated abdominal pain away from the hernia. He has no nausea or vomiting. He has had no developmental abnormalities of the genitalia. Both testes are present and descended. He has never had prostate cancer, prostate radiation, or any therapy to the prostate gland. He is not on any blood thinners. He has never had heart attack, stroke, heart murmur, heart valve replacement, or cardiac arrhythmia. He does have obstructive sleep apnea and wears a CPAP. The patient is disabled with multiple sclerosis for the last 14 years. He also has problems with cervical stenosis and L4-L5 back problems. It has been greater than 10 years since his last colonoscopy. Clinical exam reveals him to have a very large incarcerated umbilical hernia. The hernia sac is 4.5 cm, the internal opening is not palpable. Liver and spleen are not palpable. BMI is 32.98. There are no incisions on the abdomen. The umbilical hernia is tender on compression. There is no guarding or rebound. There is no overlying cellulitis or erythema. Both testes are present and descended. There is a moderate sized right inguinal hernia. There is no left inguinal hernia. There are no extra cord structures. Examinations were performed both flat and standing with Valsalva. This patient will require colonoscopy based on his change in bowel habits with new onset of constipation.     This patient will require a laparoscopic right inguinal herniorrhaphy with mesh. Repair of his incarcerated umbilical hernia. Had a full conversation with the patient in great detail about all the above. All risks, benefits, complications and alternatives to the proposed procedure(s) were fully discussed with the patient. All questions from the patient were answered in detail. A description of the procedure(s) and possible outcomes was fully discussed. The patient seemed to understand the conversation and its details. Consent for the procedure(s) was confirmed with the patient. We discussed the possible exacerbation of MS with advanced surgical procedures. This would be a mild to moderate procedure, and is not usually associated with exacerbation of MS.

## 2022-04-25 ENCOUNTER — TELEPHONE (OUTPATIENT)
Dept: SURGERY | Facility: CLINIC | Age: 42
End: 2022-04-25

## 2022-04-26 ENCOUNTER — LAB ENCOUNTER (OUTPATIENT)
Dept: LAB | Facility: HOSPITAL | Age: 42
End: 2022-04-26
Attending: COLON & RECTAL SURGERY
Payer: COMMERCIAL

## 2022-04-26 DIAGNOSIS — K40.90 UNILATERAL INGUINAL HERNIA WITHOUT OBSTRUCTION OR GANGRENE, RECURRENCE NOT SPECIFIED: ICD-10-CM

## 2022-04-26 LAB — SARS-COV-2 RNA RESP QL NAA+PROBE: NOT DETECTED

## 2022-04-29 ENCOUNTER — HOSPITAL ENCOUNTER (OUTPATIENT)
Facility: HOSPITAL | Age: 42
Setting detail: HOSPITAL OUTPATIENT SURGERY
Discharge: HOME | End: 2022-04-29
Attending: COLON & RECTAL SURGERY | Admitting: COLON & RECTAL SURGERY
Payer: COMMERCIAL

## 2022-04-29 ENCOUNTER — ANESTHESIA (OUTPATIENT)
Dept: SURGERY | Facility: HOSPITAL | Age: 42
End: 2022-04-29
Payer: COMMERCIAL

## 2022-04-29 ENCOUNTER — ANESTHESIA EVENT (OUTPATIENT)
Dept: SURGERY | Facility: HOSPITAL | Age: 42
End: 2022-04-29
Payer: COMMERCIAL

## 2022-04-29 VITALS
SYSTOLIC BLOOD PRESSURE: 139 MMHG | OXYGEN SATURATION: 92 % | BODY MASS INDEX: 33.8 KG/M2 | RESPIRATION RATE: 18 BRPM | DIASTOLIC BLOOD PRESSURE: 88 MMHG | TEMPERATURE: 98 F | HEART RATE: 87 BPM | HEIGHT: 73 IN | WEIGHT: 255 LBS

## 2022-04-29 DIAGNOSIS — K40.90 UNILATERAL INGUINAL HERNIA WITHOUT OBSTRUCTION OR GANGRENE, RECURRENCE NOT SPECIFIED: Primary | ICD-10-CM

## 2022-04-29 PROCEDURE — 0YU54JZ SUPPLEMENT RIGHT INGUINAL REGION WITH SYNTHETIC SUBSTITUTE, PERCUTANEOUS ENDOSCOPIC APPROACH: ICD-10-PCS | Performed by: COLON & RECTAL SURGERY

## 2022-04-29 PROCEDURE — 0WQF0ZZ REPAIR ABDOMINAL WALL, OPEN APPROACH: ICD-10-PCS | Performed by: COLON & RECTAL SURGERY

## 2022-04-29 DEVICE — BARD MESH
Type: IMPLANTABLE DEVICE | Site: INGUINAL | Status: FUNCTIONAL
Brand: BARD MESH

## 2022-04-29 RX ORDER — ONDANSETRON 2 MG/ML
INJECTION INTRAMUSCULAR; INTRAVENOUS AS NEEDED
Status: DISCONTINUED | OUTPATIENT
Start: 2022-04-29 | End: 2022-04-29 | Stop reason: SURG

## 2022-04-29 RX ORDER — NALOXONE HYDROCHLORIDE 0.4 MG/ML
80 INJECTION, SOLUTION INTRAMUSCULAR; INTRAVENOUS; SUBCUTANEOUS AS NEEDED
Status: DISCONTINUED | OUTPATIENT
Start: 2022-04-29 | End: 2022-04-29

## 2022-04-29 RX ORDER — SODIUM CHLORIDE, SODIUM LACTATE, POTASSIUM CHLORIDE, CALCIUM CHLORIDE 600; 310; 30; 20 MG/100ML; MG/100ML; MG/100ML; MG/100ML
INJECTION, SOLUTION INTRAVENOUS CONTINUOUS
Status: DISCONTINUED | OUTPATIENT
Start: 2022-04-29 | End: 2022-04-29

## 2022-04-29 RX ORDER — NEOSTIGMINE METHYLSULFATE 1 MG/ML
INJECTION INTRAVENOUS AS NEEDED
Status: DISCONTINUED | OUTPATIENT
Start: 2022-04-29 | End: 2022-04-29 | Stop reason: SURG

## 2022-04-29 RX ORDER — HYDROCODONE BITARTRATE AND ACETAMINOPHEN 5; 325 MG/1; MG/1
1 TABLET ORAL AS NEEDED
Status: DISCONTINUED | OUTPATIENT
Start: 2022-04-29 | End: 2022-04-29

## 2022-04-29 RX ORDER — HYDROMORPHONE HYDROCHLORIDE 1 MG/ML
0.4 INJECTION, SOLUTION INTRAMUSCULAR; INTRAVENOUS; SUBCUTANEOUS EVERY 5 MIN PRN
Status: DISCONTINUED | OUTPATIENT
Start: 2022-04-29 | End: 2022-04-29

## 2022-04-29 RX ORDER — HYDROCODONE BITARTRATE AND ACETAMINOPHEN 5; 325 MG/1; MG/1
1-2 TABLET ORAL EVERY 4 HOURS PRN
Qty: 25 TABLET | Refills: 0 | Status: SHIPPED | OUTPATIENT
Start: 2022-04-29

## 2022-04-29 RX ORDER — GLYCOPYRROLATE 0.2 MG/ML
INJECTION, SOLUTION INTRAMUSCULAR; INTRAVENOUS AS NEEDED
Status: DISCONTINUED | OUTPATIENT
Start: 2022-04-29 | End: 2022-04-29 | Stop reason: SURG

## 2022-04-29 RX ORDER — DEXAMETHASONE SODIUM PHOSPHATE 4 MG/ML
VIAL (ML) INJECTION AS NEEDED
Status: DISCONTINUED | OUTPATIENT
Start: 2022-04-29 | End: 2022-04-29 | Stop reason: SURG

## 2022-04-29 RX ORDER — ROCURONIUM BROMIDE 10 MG/ML
INJECTION, SOLUTION INTRAVENOUS AS NEEDED
Status: DISCONTINUED | OUTPATIENT
Start: 2022-04-29 | End: 2022-04-29 | Stop reason: SURG

## 2022-04-29 RX ORDER — LIDOCAINE HYDROCHLORIDE 10 MG/ML
INJECTION, SOLUTION EPIDURAL; INFILTRATION; INTRACAUDAL; PERINEURAL AS NEEDED
Status: DISCONTINUED | OUTPATIENT
Start: 2022-04-29 | End: 2022-04-29 | Stop reason: SURG

## 2022-04-29 RX ORDER — ONDANSETRON 2 MG/ML
4 INJECTION INTRAMUSCULAR; INTRAVENOUS AS NEEDED
Status: DISCONTINUED | OUTPATIENT
Start: 2022-04-29 | End: 2022-04-29

## 2022-04-29 RX ORDER — METOCLOPRAMIDE HYDROCHLORIDE 5 MG/ML
10 INJECTION INTRAMUSCULAR; INTRAVENOUS AS NEEDED
Status: DISCONTINUED | OUTPATIENT
Start: 2022-04-29 | End: 2022-04-29

## 2022-04-29 RX ORDER — HYDROMORPHONE HYDROCHLORIDE 1 MG/ML
INJECTION, SOLUTION INTRAMUSCULAR; INTRAVENOUS; SUBCUTANEOUS
Status: COMPLETED
Start: 2022-04-29 | End: 2022-04-29

## 2022-04-29 RX ORDER — BUPIVACAINE HYDROCHLORIDE AND EPINEPHRINE 5; 5 MG/ML; UG/ML
INJECTION, SOLUTION EPIDURAL; INTRACAUDAL; PERINEURAL AS NEEDED
Status: DISCONTINUED | OUTPATIENT
Start: 2022-04-29 | End: 2022-04-29 | Stop reason: HOSPADM

## 2022-04-29 RX ORDER — HYDROCODONE BITARTRATE AND ACETAMINOPHEN 5; 325 MG/1; MG/1
2 TABLET ORAL AS NEEDED
Status: DISCONTINUED | OUTPATIENT
Start: 2022-04-29 | End: 2022-04-29

## 2022-04-29 RX ORDER — MIDAZOLAM HYDROCHLORIDE 1 MG/ML
INJECTION INTRAMUSCULAR; INTRAVENOUS AS NEEDED
Status: DISCONTINUED | OUTPATIENT
Start: 2022-04-29 | End: 2022-04-29 | Stop reason: SURG

## 2022-04-29 RX ORDER — ACETAMINOPHEN 500 MG
1000 TABLET ORAL ONCE
Status: DISCONTINUED | OUTPATIENT
Start: 2022-04-29 | End: 2022-04-29 | Stop reason: HOSPADM

## 2022-04-29 RX ORDER — KETOROLAC TROMETHAMINE 30 MG/ML
INJECTION, SOLUTION INTRAMUSCULAR; INTRAVENOUS AS NEEDED
Status: DISCONTINUED | OUTPATIENT
Start: 2022-04-29 | End: 2022-04-29 | Stop reason: SURG

## 2022-04-29 RX ORDER — HEPARIN SODIUM 5000 [USP'U]/ML
5000 INJECTION, SOLUTION INTRAVENOUS; SUBCUTANEOUS ONCE
Status: COMPLETED | OUTPATIENT
Start: 2022-04-29 | End: 2022-04-29

## 2022-04-29 RX ORDER — CEFAZOLIN SODIUM/WATER 2 G/20 ML
2 SYRINGE (ML) INTRAVENOUS ONCE
Status: COMPLETED | OUTPATIENT
Start: 2022-04-29 | End: 2022-04-29

## 2022-04-29 RX ADMIN — CEFAZOLIN SODIUM/WATER 2 G: 2 G/20 ML SYRINGE (ML) INTRAVENOUS at 15:41:00

## 2022-04-29 RX ADMIN — ONDANSETRON 4 MG: 2 INJECTION INTRAMUSCULAR; INTRAVENOUS at 15:50:00

## 2022-04-29 RX ADMIN — NEOSTIGMINE METHYLSULFATE 3 MG: 1 INJECTION INTRAVENOUS at 16:37:00

## 2022-04-29 RX ADMIN — LIDOCAINE HYDROCHLORIDE 50 MG: 10 INJECTION, SOLUTION EPIDURAL; INFILTRATION; INTRACAUDAL; PERINEURAL at 15:38:00

## 2022-04-29 RX ADMIN — KETOROLAC TROMETHAMINE 30 MG: 30 INJECTION, SOLUTION INTRAMUSCULAR; INTRAVENOUS at 16:35:00

## 2022-04-29 RX ADMIN — ROCURONIUM BROMIDE 50 MG: 10 INJECTION, SOLUTION INTRAVENOUS at 15:41:00

## 2022-04-29 RX ADMIN — GLYCOPYRROLATE 0.4 MG: 0.2 INJECTION, SOLUTION INTRAMUSCULAR; INTRAVENOUS at 16:37:00

## 2022-04-29 RX ADMIN — MIDAZOLAM HYDROCHLORIDE 2 MG: 1 INJECTION INTRAMUSCULAR; INTRAVENOUS at 15:38:00

## 2022-04-29 RX ADMIN — DEXAMETHASONE SODIUM PHOSPHATE 4 MG: 4 MG/ML VIAL (ML) INJECTION at 15:50:00

## 2022-04-29 NOTE — OPERATIVE REPORT
[facility]  Operative Note    [facility] Eric Esparza Location: [facility] [id number] MRN [id number]   Admission Date 4/29/2022 Operation Date 4/29/2022   Attending Physician Holly Severe, MD Operating Physician Ellis Sharma MD     Pre-Operative Diagnosis: Unilateral inguinal hernia without obstruction or gangrene, recurrence not specified [F69.74] umbilical hernia incarcerated    Post-Operative Diagnosis: Direct right inguinal hernia, bladder contents; incarcerated umbilical hernia, preperitoneal fat and omentum contents    Procedure Performed:  Laparoscopic right inguinal herniorraphy with mesh. Umbilical herniorrhaphy. Surgeon(s) and Role:     * Holly Severe, MD - Primary  Assistant: Shannan Vee PA-C,  The assistance of the physician assistant was absolutely essential to the proper conduct of this case and further assisted with exact proper positioning of the mesh during tac application and complex dissection within the groin structures and anatomy. Anesthesia: General    History of Present Illness:  Very large umbilical hernia, symptomatic, incarcerated. Moderate size right inguinal hernia, reducible      Operative Findings: This patient was found to have a very large right direct inguinal hernia 20 to 30% of the bladder is involved  Umbilical hernia, omentum and preperitoneal fat incarcerated, 2 cm fascial defect closed with interrupted suture of #1 Ethibond  Diagnostic laparoscopy revealed no significant other findings  Liver within normal limits  No significant adhesions  The patient was delivered to recovery room in stable condition      Description of Procedure: Following adequate general anesthesia, the patient was placed in the supine position on the operating room table. The abdomen was prepped with Chlorhexidine. Sterile drapes were placed with wide exposure of the abdomen from xiphoid to pubis. A supraumbilical incision was made.   The hernia sac at the umbilicus was identified and dissected free from surrounding structures. The hernia sac and contents were severed and discarded. Stay sutures were placed to the right and left of the fascial defect at the umbilicus. An Allan trocar was placed in the fascial defect at the umbilicus. The abdomen was insufflated with CO2. Diagnostic laparoscopy was performed. The above-listed findings were noted. Two other trocars were placed in a symmetric fashion at the level of the umbilicus lateral to the rectus sheath. Using the above-listed trocars, we were able to dissect the pelvic floor in the following fashion. A transverse incision was made over the side of the hernia extending from lateral to medial, exposing the pelvic floor structures. Careful dissection was used to identify the pubic tubercle, Patrick's ligament, the iliopubic tract, and the posterior rectus fascia. A Marlex mesh graft was fashioned to fit the pelvic floor. It was secured in place with a hernia tacker. It was fixed medially to the pubic tubercle, posteriorly to Patrick's ligament, laterally to the iliopubic tract, and anteriorly to the posterior rectus fascia. The peritoneum was then closed over the hernia repair and Marlex mesh using the hernia tacker. All trocars were removed under direct visualization with a laparoscope after hemostasis was assured at all operative sites. All laparoscopy equipment was removed and accounted for. The CO2 was suctioned from the abdominal cavity. The umbilical hernia was repaired in a transverse fashion with interrupted 0 Ethibond suture at the level of the fascia. The umbilicus was reinserted back down to the anterior fascia with interrupted 3-0 Vicryl. The umbilical incision was closed with running 5-0 undyed Vicryl in a subcuticular fashion. The port sites were closed at the skin level with 5-0 undyed Vicryl in a subcuticular fashion. Steri-Strips were placed over benzoin adhesive.   Sterile dressings were placed. Marcaine 0.5% with epinephrine was injected into the wound margins. The patient was delivered to the recovery room in stable postoperative condition.      Complications:  none    EBL: 5cc    Path: none    Jazmine Nath MD  4/1/2015  10:30 AM

## 2022-04-29 NOTE — INTERVAL H&P NOTE
Pre-op Diagnosis: Unilateral inguinal hernia without obstruction or gangrene, recurrence not specified [K40.90]    The above referenced H&P was reviewed by Maddy Brown MD on 4/29/2022, the patient was examined and no significant changes have occurred in the patient's condition since the H&P was performed. I discussed with the patient and/or legal representative the potential benefits, risks and side effects of this procedure; the likelihood of the patient achieving goals; and potential problems that might occur during recuperation. I discussed reasonable alternatives to the procedure, including risks, benefits and side effects related to the alternatives and risks related to not receiving this procedure. We will proceed with procedure as planned.

## 2022-05-03 RX ORDER — VENLAFAXINE HYDROCHLORIDE 75 MG/1
75 CAPSULE, EXTENDED RELEASE ORAL
Qty: 30 CAPSULE | Refills: 0 | Status: SHIPPED | OUTPATIENT
Start: 2022-05-03

## 2022-05-16 ENCOUNTER — OFFICE VISIT (OUTPATIENT)
Dept: SURGERY | Facility: CLINIC | Age: 42
End: 2022-05-16

## 2022-05-16 VITALS
TEMPERATURE: 97 F | HEART RATE: 81 BPM | HEIGHT: 73 IN | SYSTOLIC BLOOD PRESSURE: 128 MMHG | DIASTOLIC BLOOD PRESSURE: 87 MMHG | WEIGHT: 255 LBS | BODY MASS INDEX: 33.8 KG/M2

## 2022-05-16 DIAGNOSIS — K42.0 UMBILICAL HERNIA, INCARCERATED: ICD-10-CM

## 2022-05-16 DIAGNOSIS — K40.90 UNILATERAL INGUINAL HERNIA WITHOUT OBSTRUCTION OR GANGRENE, RECURRENCE NOT SPECIFIED: Primary | ICD-10-CM

## 2022-05-16 PROCEDURE — 3074F SYST BP LT 130 MM HG: CPT

## 2022-05-16 PROCEDURE — 99024 POSTOP FOLLOW-UP VISIT: CPT

## 2022-05-16 PROCEDURE — 3008F BODY MASS INDEX DOCD: CPT

## 2022-05-16 PROCEDURE — 3079F DIAST BP 80-89 MM HG: CPT

## 2022-05-16 NOTE — PATIENT INSTRUCTIONS
The patient presents for continued care and evaluation following a direct right inguinal hernia, bladder contents; incarcerated umbilical hernia, preperitoneal fat and omentum contents on 4/29/2022. The patient states he has been doing well since his operation. He states he has occasional periumbilical discomfort, but has no other complaints. He has been refraining from lifting more than 5 pounds since his operation. He is no longer taking any narcotics. He is tolerating activities of daily living. He states his incisions are healing well. He denies drainage from his incision. He peeled the Steri-Strips off several days ago. He does not need a return to work note. Clinical exam of the patient's abdomen reveals it to be soft, nontender, nondistended. His laparoscopic incision sites are clean, dry, intact without surrounding erythema or cellulitis. There is no drainage from his incision sites. There is no evidence of umbilical or inguinal hernias. The patient is doing well status post right inguinal hernia repair with mesh and umbilical herniorrhaphy on 4/29/2022. I discussed with the patient that they should refrain from any bending, pushing, pulling, twisting, or lifting of a force greater than 20 pounds for 8 weeks post-op. The patient should avoid submerging their incisions in a bath, hot tub, pool for a total of 2 weeks postoperatively. All of the patient's questions were answered. The patient verbalized understanding and agreement with the plan of care. I have no further follow-up scheduled with this patient at this time. This patient can see me or Dr. Luna Sat on an as-needed basis. This patient should return urgently for any problems or complications related to the surgical intervention.

## 2022-05-18 DIAGNOSIS — G35 MULTIPLE SCLEROSIS (HCC): ICD-10-CM

## 2022-05-18 DIAGNOSIS — M62.838 MUSCLE SPASM: ICD-10-CM

## 2022-05-18 DIAGNOSIS — G25.81 RESTLESS LEG: ICD-10-CM

## 2022-05-18 DIAGNOSIS — N52.9 ORGANIC ERECTILE DYSFUNCTION: ICD-10-CM

## 2022-05-18 RX ORDER — TADALAFIL 20 MG/1
TABLET ORAL
Qty: 30 TABLET | Refills: 2 | OUTPATIENT
Start: 2022-05-18

## 2022-05-18 RX ORDER — DIAZEPAM 5 MG/1
5 TABLET ORAL EVERY 6 HOURS PRN
Qty: 30 TABLET | Refills: 1 | OUTPATIENT
Start: 2022-05-18

## 2022-05-23 ENCOUNTER — OFFICE VISIT (OUTPATIENT)
Dept: FAMILY MEDICINE CLINIC | Facility: CLINIC | Age: 42
End: 2022-05-23
Payer: COMMERCIAL

## 2022-05-23 VITALS
WEIGHT: 254 LBS | OXYGEN SATURATION: 98 % | HEART RATE: 76 BPM | HEIGHT: 73 IN | RESPIRATION RATE: 16 BRPM | DIASTOLIC BLOOD PRESSURE: 84 MMHG | SYSTOLIC BLOOD PRESSURE: 128 MMHG | BODY MASS INDEX: 33.66 KG/M2 | TEMPERATURE: 98 F

## 2022-05-23 DIAGNOSIS — Z12.5 PROSTATE CANCER SCREENING: ICD-10-CM

## 2022-05-23 DIAGNOSIS — F32.5 MAJOR DEPRESSIVE DISORDER IN FULL REMISSION, UNSPECIFIED WHETHER RECURRENT (HCC): ICD-10-CM

## 2022-05-23 DIAGNOSIS — Z00.00 WELLNESS EXAMINATION: Primary | ICD-10-CM

## 2022-05-23 DIAGNOSIS — N52.9 ORGANIC ERECTILE DYSFUNCTION: ICD-10-CM

## 2022-05-23 DIAGNOSIS — M62.838 MUSCLE SPASM: ICD-10-CM

## 2022-05-23 DIAGNOSIS — G35 MULTIPLE SCLEROSIS (HCC): ICD-10-CM

## 2022-05-23 DIAGNOSIS — Z13.29 THYROID DISORDER SCREEN: ICD-10-CM

## 2022-05-23 DIAGNOSIS — G25.81 RESTLESS LEG: ICD-10-CM

## 2022-05-23 DIAGNOSIS — Z13.220 LIPID SCREENING: ICD-10-CM

## 2022-05-23 DIAGNOSIS — Z13.0 SCREENING FOR DEFICIENCY ANEMIA: ICD-10-CM

## 2022-05-23 DIAGNOSIS — E29.1 HYPOGONADISM IN MALE: ICD-10-CM

## 2022-05-23 PROCEDURE — 99396 PREV VISIT EST AGE 40-64: CPT | Performed by: FAMILY MEDICINE

## 2022-05-23 PROCEDURE — 3008F BODY MASS INDEX DOCD: CPT | Performed by: FAMILY MEDICINE

## 2022-05-23 PROCEDURE — 3079F DIAST BP 80-89 MM HG: CPT | Performed by: FAMILY MEDICINE

## 2022-05-23 PROCEDURE — 3074F SYST BP LT 130 MM HG: CPT | Performed by: FAMILY MEDICINE

## 2022-05-23 RX ORDER — VENLAFAXINE HYDROCHLORIDE 75 MG/1
75 CAPSULE, EXTENDED RELEASE ORAL
Qty: 90 CAPSULE | Refills: 1 | Status: SHIPPED | OUTPATIENT
Start: 2022-05-23

## 2022-05-23 RX ORDER — TIZANIDINE 4 MG/1
4 TABLET ORAL NIGHTLY
Qty: 90 TABLET | Refills: 0 | Status: SHIPPED | OUTPATIENT
Start: 2022-05-23

## 2022-05-23 RX ORDER — DIAZEPAM 5 MG/1
5 TABLET ORAL EVERY 6 HOURS PRN
Qty: 30 TABLET | Refills: 2 | Status: SHIPPED | OUTPATIENT
Start: 2022-05-23

## 2022-05-23 RX ORDER — TADALAFIL 20 MG/1
TABLET ORAL
Qty: 30 TABLET | Refills: 2 | Status: SHIPPED | OUTPATIENT
Start: 2022-05-23

## 2022-06-06 ENCOUNTER — PATIENT MESSAGE (OUTPATIENT)
Dept: FAMILY MEDICINE CLINIC | Facility: CLINIC | Age: 42
End: 2022-06-06

## 2022-06-06 DIAGNOSIS — N52.9 ORGANIC ERECTILE DYSFUNCTION: ICD-10-CM

## 2022-06-06 RX ORDER — TADALAFIL 20 MG/1
TABLET ORAL
Qty: 30 TABLET | Refills: 2 | OUTPATIENT
Start: 2022-06-06

## 2022-06-27 DIAGNOSIS — N52.9 ORGANIC ERECTILE DYSFUNCTION: ICD-10-CM

## 2022-06-27 RX ORDER — GABAPENTIN 300 MG/1
300 CAPSULE ORAL 3 TIMES DAILY
Qty: 90 CAPSULE | Refills: 0 | Status: SHIPPED | OUTPATIENT
Start: 2022-06-27

## 2022-06-27 RX ORDER — TADALAFIL 20 MG/1
TABLET ORAL
Qty: 30 TABLET | Refills: 2 | Status: SHIPPED | OUTPATIENT
Start: 2022-06-27

## 2022-07-29 DIAGNOSIS — G35 MULTIPLE SCLEROSIS (HCC): ICD-10-CM

## 2022-07-29 DIAGNOSIS — G25.81 RESTLESS LEG: ICD-10-CM

## 2022-07-29 DIAGNOSIS — M62.838 MUSCLE SPASM: ICD-10-CM

## 2022-07-29 RX ORDER — DIAZEPAM 5 MG/1
5 TABLET ORAL EVERY 6 HOURS PRN
Qty: 30 TABLET | Refills: 2 | Status: SHIPPED | OUTPATIENT
Start: 2022-07-29

## 2022-08-18 ENCOUNTER — PATIENT MESSAGE (OUTPATIENT)
Dept: FAMILY MEDICINE CLINIC | Facility: CLINIC | Age: 42
End: 2022-08-18

## 2022-08-18 DIAGNOSIS — Z12.83 SKIN CANCER SCREENING: Primary | ICD-10-CM

## 2022-08-18 NOTE — TELEPHONE ENCOUNTER
From: BRENT CRISTOBAL [facility]  To: Cyrus Hernández MD  Sent: 8/18/2022 11:28 AM CDT  Subject: Dermatologist     Bang Resendiz - can you please refer me to a Dermatologist? I have a few moles that we discussed during my last physical and I would like to have examined.      Thank you     Nela Odonnell

## 2022-08-22 RX ORDER — TIZANIDINE 4 MG/1
TABLET ORAL
Qty: 30 TABLET | Refills: 2 | Status: SHIPPED | OUTPATIENT
Start: 2022-08-22

## 2022-09-14 DIAGNOSIS — G25.81 RESTLESS LEG: ICD-10-CM

## 2022-09-14 DIAGNOSIS — M62.838 MUSCLE SPASM: ICD-10-CM

## 2022-09-14 DIAGNOSIS — F32.5 MAJOR DEPRESSIVE DISORDER IN FULL REMISSION, UNSPECIFIED WHETHER RECURRENT (HCC): ICD-10-CM

## 2022-09-14 DIAGNOSIS — G35 MULTIPLE SCLEROSIS (HCC): ICD-10-CM

## 2022-09-14 RX ORDER — VENLAFAXINE HYDROCHLORIDE 75 MG/1
75 CAPSULE, EXTENDED RELEASE ORAL
Qty: 90 CAPSULE | Refills: 1 | Status: SHIPPED | OUTPATIENT
Start: 2022-09-14

## 2022-09-14 RX ORDER — DIAZEPAM 5 MG/1
5 TABLET ORAL EVERY 6 HOURS PRN
Qty: 30 TABLET | Refills: 2 | Status: SHIPPED | OUTPATIENT
Start: 2022-09-14

## 2022-10-28 DIAGNOSIS — G35 MULTIPLE SCLEROSIS (HCC): ICD-10-CM

## 2022-10-28 DIAGNOSIS — N52.9 ORGANIC ERECTILE DYSFUNCTION: ICD-10-CM

## 2022-10-28 DIAGNOSIS — G25.81 RESTLESS LEG: ICD-10-CM

## 2022-10-28 DIAGNOSIS — M62.838 MUSCLE SPASM: ICD-10-CM

## 2022-10-28 RX ORDER — GABAPENTIN 300 MG/1
300 CAPSULE ORAL 3 TIMES DAILY
Qty: 90 CAPSULE | Refills: 0 | Status: SHIPPED | OUTPATIENT
Start: 2022-10-28

## 2022-10-28 RX ORDER — DIAZEPAM 5 MG/1
5 TABLET ORAL EVERY 6 HOURS PRN
Qty: 30 TABLET | Refills: 2 | Status: SHIPPED | OUTPATIENT
Start: 2022-10-28

## 2022-10-28 RX ORDER — TADALAFIL 20 MG/1
TABLET ORAL
Qty: 30 TABLET | Refills: 2 | Status: SHIPPED | OUTPATIENT
Start: 2022-10-28

## 2022-10-28 NOTE — TELEPHONE ENCOUNTER
last ov 5/23/2022    Last refill diazepam 9/14/2022  Last refill gabapentin 6/27/2022    Last refill cialis 6/27/2022

## 2022-12-07 ENCOUNTER — LAB ENCOUNTER (OUTPATIENT)
Dept: LAB | Age: 42
End: 2022-12-07
Attending: FAMILY MEDICINE
Payer: COMMERCIAL

## 2022-12-07 DIAGNOSIS — E29.1 HYPOGONADISM IN MALE: ICD-10-CM

## 2022-12-07 DIAGNOSIS — Z00.00 WELLNESS EXAMINATION: ICD-10-CM

## 2022-12-07 DIAGNOSIS — Z13.220 LIPID SCREENING: ICD-10-CM

## 2022-12-07 DIAGNOSIS — D51.9 VITAMIN B12 DEFICIENCY ANEMIA: Primary | ICD-10-CM

## 2022-12-07 DIAGNOSIS — Z13.29 THYROID DISORDER SCREEN: ICD-10-CM

## 2022-12-07 DIAGNOSIS — Z13.0 SCREENING FOR DEFICIENCY ANEMIA: ICD-10-CM

## 2022-12-07 DIAGNOSIS — Z12.5 PROSTATE CANCER SCREENING: ICD-10-CM

## 2022-12-07 DIAGNOSIS — N52.9 ORGANIC ERECTILE DYSFUNCTION: ICD-10-CM

## 2022-12-07 DIAGNOSIS — E07.9 DISORDER OF THYROID GLAND: ICD-10-CM

## 2022-12-07 LAB
ALBUMIN SERPL-MCNC: 3.7 G/DL (ref 3.4–5)
ALBUMIN/GLOB SERPL: 1.4 {RATIO} (ref 1–2)
ALP LIVER SERPL-CCNC: 66 U/L
ALT SERPL-CCNC: 28 U/L
ANION GAP SERPL CALC-SCNC: 5 MMOL/L (ref 0–18)
AST SERPL-CCNC: 13 U/L (ref 15–37)
BASOPHILS # BLD AUTO: 0.06 X10(3) UL (ref 0–0.2)
BASOPHILS NFR BLD AUTO: 0.9 %
BILIRUB SERPL-MCNC: 0.4 MG/DL (ref 0.1–2)
BUN BLD-MCNC: 12 MG/DL (ref 7–18)
CALCIUM BLD-MCNC: 9.3 MG/DL (ref 8.5–10.1)
CHLORIDE SERPL-SCNC: 110 MMOL/L (ref 98–112)
CHOLEST SERPL-MCNC: 225 MG/DL (ref ?–200)
CO2 SERPL-SCNC: 27 MMOL/L (ref 21–32)
COMPLEXED PSA SERPL-MCNC: 0.55 NG/ML (ref ?–4)
CREAT BLD-MCNC: 0.87 MG/DL
EOSINOPHIL # BLD AUTO: 0.39 X10(3) UL (ref 0–0.7)
EOSINOPHIL NFR BLD AUTO: 5.7 %
ERYTHROCYTE [DISTWIDTH] IN BLOOD BY AUTOMATED COUNT: 12.1 %
FASTING PATIENT LIPID ANSWER: NO
FASTING STATUS PATIENT QL REPORTED: NO
GFR SERPLBLD BASED ON 1.73 SQ M-ARVRAT: 109 ML/MIN/1.73M2 (ref 60–?)
GLOBULIN PLAS-MCNC: 2.7 G/DL (ref 2.8–4.4)
GLUCOSE BLD-MCNC: 108 MG/DL (ref 70–99)
HCT VFR BLD AUTO: 47.2 %
HDLC SERPL-MCNC: 79 MG/DL (ref 40–59)
HGB BLD-MCNC: 16.5 G/DL
IMM GRANULOCYTES # BLD AUTO: 0.02 X10(3) UL (ref 0–1)
IMM GRANULOCYTES NFR BLD: 0.3 %
LDLC SERPL CALC-MCNC: 126 MG/DL (ref ?–100)
LYMPHOCYTES # BLD AUTO: 1.76 X10(3) UL (ref 1–4)
LYMPHOCYTES NFR BLD AUTO: 25.9 %
MCH RBC QN AUTO: 33.3 PG (ref 26–34)
MCHC RBC AUTO-ENTMCNC: 35 G/DL (ref 31–37)
MCV RBC AUTO: 95.4 FL
MONOCYTES # BLD AUTO: 0.57 X10(3) UL (ref 0.1–1)
MONOCYTES NFR BLD AUTO: 8.4 %
NEUTROPHILS # BLD AUTO: 3.99 X10 (3) UL (ref 1.5–7.7)
NEUTROPHILS # BLD AUTO: 3.99 X10(3) UL (ref 1.5–7.7)
NEUTROPHILS NFR BLD AUTO: 58.8 %
NONHDLC SERPL-MCNC: 146 MG/DL (ref ?–130)
OSMOLALITY SERPL CALC.SUM OF ELEC: 294 MOSM/KG (ref 275–295)
PLATELET # BLD AUTO: 219 10(3)UL (ref 150–450)
POTASSIUM SERPL-SCNC: 4.2 MMOL/L (ref 3.5–5.1)
PROT SERPL-MCNC: 6.4 G/DL (ref 6.4–8.2)
RBC # BLD AUTO: 4.95 X10(6)UL
SODIUM SERPL-SCNC: 142 MMOL/L (ref 136–145)
TESTOST SERPL-MCNC: 561.85 NG/DL
TRIGL SERPL-MCNC: 115 MG/DL (ref 30–149)
TSI SER-ACNC: 0.56 MIU/ML (ref 0.36–3.74)
VIT B12 SERPL-MCNC: 668 PG/ML (ref 193–986)
VLDLC SERPL CALC-MCNC: 20 MG/DL (ref 0–30)
WBC # BLD AUTO: 6.8 X10(3) UL (ref 4–11)

## 2022-12-07 PROCEDURE — 85025 COMPLETE CBC W/AUTO DIFF WBC: CPT

## 2022-12-07 PROCEDURE — 84403 ASSAY OF TOTAL TESTOSTERONE: CPT

## 2022-12-07 PROCEDURE — 36415 COLL VENOUS BLD VENIPUNCTURE: CPT

## 2022-12-07 PROCEDURE — 82607 VITAMIN B-12: CPT

## 2022-12-07 PROCEDURE — 84443 ASSAY THYROID STIM HORMONE: CPT

## 2022-12-07 PROCEDURE — 80061 LIPID PANEL: CPT

## 2022-12-07 PROCEDURE — 80053 COMPREHEN METABOLIC PANEL: CPT

## 2023-01-05 DIAGNOSIS — G25.81 RESTLESS LEG: ICD-10-CM

## 2023-01-05 DIAGNOSIS — M62.838 MUSCLE SPASM: ICD-10-CM

## 2023-01-05 DIAGNOSIS — G35 MULTIPLE SCLEROSIS (HCC): ICD-10-CM

## 2023-01-06 RX ORDER — DIAZEPAM 5 MG/1
5 TABLET ORAL EVERY 6 HOURS PRN
Qty: 30 TABLET | Refills: 2 | Status: SHIPPED | OUTPATIENT
Start: 2023-01-06

## 2023-02-14 DIAGNOSIS — G35 MULTIPLE SCLEROSIS (HCC): ICD-10-CM

## 2023-02-14 DIAGNOSIS — M62.838 MUSCLE SPASM: ICD-10-CM

## 2023-02-14 DIAGNOSIS — G25.81 RESTLESS LEG: ICD-10-CM

## 2023-02-14 DIAGNOSIS — N52.9 ORGANIC ERECTILE DYSFUNCTION: ICD-10-CM

## 2023-02-15 RX ORDER — TADALAFIL 20 MG/1
TABLET ORAL
Qty: 30 TABLET | Refills: 2 | Status: SHIPPED | OUTPATIENT
Start: 2023-02-15

## 2023-02-15 RX ORDER — DIAZEPAM 5 MG/1
5 TABLET ORAL EVERY 6 HOURS PRN
Qty: 30 TABLET | Refills: 2 | Status: SHIPPED | OUTPATIENT
Start: 2023-02-15

## 2023-02-15 RX ORDER — GABAPENTIN 300 MG/1
300 CAPSULE ORAL 3 TIMES DAILY
Qty: 90 CAPSULE | Refills: 0 | Status: SHIPPED | OUTPATIENT
Start: 2023-02-15

## 2023-02-15 RX ORDER — TIZANIDINE 4 MG/1
4 TABLET ORAL NIGHTLY
Qty: 30 TABLET | Refills: 2 | Status: SHIPPED | OUTPATIENT
Start: 2023-02-15

## 2023-02-15 NOTE — TELEPHONE ENCOUNTER
last ov 5/23/2-22      Last  refill diazepam 1/6/2023    Last refill Cialis gabapentin 10/28/2022      Last refill tizanidine 8/22/2022

## 2023-04-13 DIAGNOSIS — N52.9 ORGANIC ERECTILE DYSFUNCTION: ICD-10-CM

## 2023-04-14 RX ORDER — TADALAFIL 20 MG/1
TABLET ORAL
Qty: 30 TABLET | Refills: 2 | Status: SHIPPED | OUTPATIENT
Start: 2023-04-14

## 2023-04-18 DIAGNOSIS — N52.9 ORGANIC ERECTILE DYSFUNCTION: ICD-10-CM

## 2023-04-18 DIAGNOSIS — M62.838 MUSCLE SPASM: ICD-10-CM

## 2023-04-18 DIAGNOSIS — G25.81 RESTLESS LEG: ICD-10-CM

## 2023-04-18 DIAGNOSIS — G35 MULTIPLE SCLEROSIS (HCC): ICD-10-CM

## 2023-04-18 RX ORDER — TADALAFIL 20 MG/1
TABLET ORAL
Qty: 30 TABLET | Refills: 2 | OUTPATIENT
Start: 2023-04-18

## 2023-04-18 RX ORDER — DIAZEPAM 5 MG/1
5 TABLET ORAL EVERY 6 HOURS PRN
Qty: 30 TABLET | Refills: 2 | Status: SHIPPED | OUTPATIENT
Start: 2023-04-18

## 2023-05-24 DIAGNOSIS — F32.5 MAJOR DEPRESSIVE DISORDER IN FULL REMISSION, UNSPECIFIED WHETHER RECURRENT (HCC): ICD-10-CM

## 2023-05-24 RX ORDER — VENLAFAXINE HYDROCHLORIDE 75 MG/1
CAPSULE, EXTENDED RELEASE ORAL
Qty: 90 CAPSULE | Refills: 1 | Status: SHIPPED | OUTPATIENT
Start: 2023-05-24

## 2023-07-11 ENCOUNTER — LAB ENCOUNTER (OUTPATIENT)
Dept: LAB | Age: 43
End: 2023-07-11
Attending: FAMILY MEDICINE
Payer: MEDICARE

## 2023-07-11 ENCOUNTER — OFFICE VISIT (OUTPATIENT)
Dept: FAMILY MEDICINE CLINIC | Facility: CLINIC | Age: 43
End: 2023-07-11
Payer: MEDICARE

## 2023-07-11 VITALS
SYSTOLIC BLOOD PRESSURE: 122 MMHG | OXYGEN SATURATION: 98 % | HEIGHT: 73 IN | TEMPERATURE: 98 F | RESPIRATION RATE: 18 BRPM | BODY MASS INDEX: 31.94 KG/M2 | DIASTOLIC BLOOD PRESSURE: 72 MMHG | HEART RATE: 78 BPM | WEIGHT: 241 LBS

## 2023-07-11 DIAGNOSIS — N30.01 ACUTE CYSTITIS WITH HEMATURIA: ICD-10-CM

## 2023-07-11 DIAGNOSIS — Z13.6 SCREENING FOR CARDIOVASCULAR CONDITION: ICD-10-CM

## 2023-07-11 DIAGNOSIS — K42.0 UMBILICAL HERNIA, INCARCERATED: ICD-10-CM

## 2023-07-11 DIAGNOSIS — K40.90 UNILATERAL INGUINAL HERNIA WITHOUT OBSTRUCTION OR GANGRENE, RECURRENCE NOT SPECIFIED: ICD-10-CM

## 2023-07-11 DIAGNOSIS — Z13.1 SCREENING FOR DIABETES MELLITUS (DM): ICD-10-CM

## 2023-07-11 DIAGNOSIS — Z00.00 ENCOUNTER FOR ANNUAL HEALTH EXAMINATION: Primary | ICD-10-CM

## 2023-07-11 DIAGNOSIS — F32.5 MAJOR DEPRESSIVE DISORDER IN FULL REMISSION, UNSPECIFIED WHETHER RECURRENT (HCC): ICD-10-CM

## 2023-07-11 DIAGNOSIS — G35 MULTIPLE SCLEROSIS (HCC): ICD-10-CM

## 2023-07-11 DIAGNOSIS — Z13.0 SCREENING FOR DEFICIENCY ANEMIA: ICD-10-CM

## 2023-07-11 DIAGNOSIS — Z12.12 SCREENING FOR COLORECTAL CANCER: ICD-10-CM

## 2023-07-11 DIAGNOSIS — Z71.6 ENCOUNTER FOR TOBACCO USE CESSATION COUNSELING: ICD-10-CM

## 2023-07-11 DIAGNOSIS — M62.838 MUSCLE SPASM: ICD-10-CM

## 2023-07-11 DIAGNOSIS — R35.0 FREQUENT URINATION: ICD-10-CM

## 2023-07-11 DIAGNOSIS — K59.01 SLOW TRANSIT CONSTIPATION: ICD-10-CM

## 2023-07-11 DIAGNOSIS — R73.09 ELEVATED GLUCOSE: ICD-10-CM

## 2023-07-11 DIAGNOSIS — Z12.11 SCREENING FOR COLORECTAL CANCER: ICD-10-CM

## 2023-07-11 DIAGNOSIS — Z13.220 SCREENING FOR LIPOID DISORDERS: ICD-10-CM

## 2023-07-11 DIAGNOSIS — G47.33 OSA (OBSTRUCTIVE SLEEP APNEA): ICD-10-CM

## 2023-07-11 DIAGNOSIS — N52.9 ORGANIC ERECTILE DYSFUNCTION: ICD-10-CM

## 2023-07-11 DIAGNOSIS — G25.81 RESTLESS LEG: ICD-10-CM

## 2023-07-11 LAB
ALBUMIN SERPL-MCNC: 3.7 G/DL (ref 3.4–5)
ALBUMIN/GLOB SERPL: 1.4 {RATIO} (ref 1–2)
ALP LIVER SERPL-CCNC: 72 U/L
ALT SERPL-CCNC: 26 U/L
ANION GAP SERPL CALC-SCNC: 7 MMOL/L (ref 0–18)
APPEARANCE: CLEAR
AST SERPL-CCNC: 26 U/L (ref 15–37)
BASOPHILS # BLD AUTO: 0.06 X10(3) UL (ref 0–0.2)
BASOPHILS NFR BLD AUTO: 0.6 %
BILIRUB SERPL-MCNC: 0.6 MG/DL (ref 0.1–2)
BILIRUBIN: NEGATIVE
BUN BLD-MCNC: 16 MG/DL (ref 7–18)
CALCIUM BLD-MCNC: 8.9 MG/DL (ref 8.5–10.1)
CHLORIDE SERPL-SCNC: 109 MMOL/L (ref 98–112)
CHOLEST SERPL-MCNC: 200 MG/DL (ref ?–200)
CO2 SERPL-SCNC: 25 MMOL/L (ref 21–32)
CREAT BLD-MCNC: 1.21 MG/DL
EOSINOPHIL # BLD AUTO: 0.32 X10(3) UL (ref 0–0.7)
EOSINOPHIL NFR BLD AUTO: 3.3 %
ERYTHROCYTE [DISTWIDTH] IN BLOOD BY AUTOMATED COUNT: 12 %
EST. AVERAGE GLUCOSE BLD GHB EST-MCNC: 105 MG/DL (ref 68–126)
FASTING PATIENT LIPID ANSWER: NO
FASTING STATUS PATIENT QL REPORTED: NO
GFR SERPLBLD BASED ON 1.73 SQ M-ARVRAT: 75 ML/MIN/1.73M2 (ref 60–?)
GLOBULIN PLAS-MCNC: 2.7 G/DL (ref 2.8–4.4)
GLUCOSE (URINE DIPSTICK): NEGATIVE MG/DL
GLUCOSE BLD-MCNC: 152 MG/DL (ref 70–99)
HBA1C MFR BLD: 5.3 % (ref ?–5.7)
HCT VFR BLD AUTO: 43.3 %
HDLC SERPL-MCNC: 65 MG/DL (ref 40–59)
HGB BLD-MCNC: 15.3 G/DL
IMM GRANULOCYTES # BLD AUTO: 0.02 X10(3) UL (ref 0–1)
IMM GRANULOCYTES NFR BLD: 0.2 %
KETONES (URINE DIPSTICK): NEGATIVE MG/DL
LDLC SERPL CALC-MCNC: 114 MG/DL (ref ?–100)
LYMPHOCYTES # BLD AUTO: 1.73 X10(3) UL (ref 1–4)
LYMPHOCYTES NFR BLD AUTO: 17.8 %
MCH RBC QN AUTO: 33.4 PG (ref 26–34)
MCHC RBC AUTO-ENTMCNC: 35.3 G/DL (ref 31–37)
MCV RBC AUTO: 94.5 FL
MONOCYTES # BLD AUTO: 0.86 X10(3) UL (ref 0.1–1)
MONOCYTES NFR BLD AUTO: 8.8 %
MULTISTIX LOT#: ABNORMAL NUMERIC
NEUTROPHILS # BLD AUTO: 6.74 X10 (3) UL (ref 1.5–7.7)
NEUTROPHILS # BLD AUTO: 6.74 X10(3) UL (ref 1.5–7.7)
NEUTROPHILS NFR BLD AUTO: 69.3 %
NITRITE, URINE: NEGATIVE
NONHDLC SERPL-MCNC: 135 MG/DL (ref ?–130)
OSMOLALITY SERPL CALC.SUM OF ELEC: 296 MOSM/KG (ref 275–295)
PH, URINE: 6 (ref 4.5–8)
PLATELET # BLD AUTO: 216 10(3)UL (ref 150–450)
POTASSIUM SERPL-SCNC: 3.5 MMOL/L (ref 3.5–5.1)
PROT SERPL-MCNC: 6.4 G/DL (ref 6.4–8.2)
PROTEIN (URINE DIPSTICK): NEGATIVE MG/DL
RBC # BLD AUTO: 4.58 X10(6)UL
SODIUM SERPL-SCNC: 141 MMOL/L (ref 136–145)
SPECIFIC GRAVITY: 1.02 (ref 1–1.03)
TRIGL SERPL-MCNC: 122 MG/DL (ref 30–149)
URINE-COLOR: YELLOW
UROBILINOGEN,SEMI-QN: 0.2 MG/DL (ref 0–1.9)
VLDLC SERPL CALC-MCNC: 21 MG/DL (ref 0–30)
WBC # BLD AUTO: 9.7 X10(3) UL (ref 4–11)

## 2023-07-11 PROCEDURE — 87086 URINE CULTURE/COLONY COUNT: CPT | Performed by: FAMILY MEDICINE

## 2023-07-11 PROCEDURE — 85025 COMPLETE CBC W/AUTO DIFF WBC: CPT

## 2023-07-11 PROCEDURE — 81003 URINALYSIS AUTO W/O SCOPE: CPT | Performed by: FAMILY MEDICINE

## 2023-07-11 PROCEDURE — 3008F BODY MASS INDEX DOCD: CPT | Performed by: FAMILY MEDICINE

## 2023-07-11 PROCEDURE — G0402 INITIAL PREVENTIVE EXAM: HCPCS | Performed by: FAMILY MEDICINE

## 2023-07-11 PROCEDURE — 3078F DIAST BP <80 MM HG: CPT | Performed by: FAMILY MEDICINE

## 2023-07-11 PROCEDURE — 36415 COLL VENOUS BLD VENIPUNCTURE: CPT

## 2023-07-11 PROCEDURE — 96160 PT-FOCUSED HLTH RISK ASSMT: CPT | Performed by: FAMILY MEDICINE

## 2023-07-11 PROCEDURE — 80061 LIPID PANEL: CPT

## 2023-07-11 PROCEDURE — 80053 COMPREHEN METABOLIC PANEL: CPT

## 2023-07-11 PROCEDURE — 99499 UNLISTED E&M SERVICE: CPT | Performed by: FAMILY MEDICINE

## 2023-07-11 PROCEDURE — 83036 HEMOGLOBIN GLYCOSYLATED A1C: CPT

## 2023-07-11 PROCEDURE — 3074F SYST BP LT 130 MM HG: CPT | Performed by: FAMILY MEDICINE

## 2023-07-11 RX ORDER — SULFAMETHOXAZOLE AND TRIMETHOPRIM 800; 160 MG/1; MG/1
1 TABLET ORAL 2 TIMES DAILY
Qty: 10 TABLET | Refills: 0 | Status: SHIPPED | OUTPATIENT
Start: 2023-07-11

## 2023-07-11 RX ORDER — VARENICLINE TARTRATE 1 MG/1
1 TABLET, FILM COATED ORAL 2 TIMES DAILY
Qty: 180 TABLET | Refills: 0 | Status: SHIPPED | OUTPATIENT
Start: 2023-07-11 | End: 2023-10-09

## 2023-07-11 RX ORDER — DIAZEPAM 5 MG/1
5 TABLET ORAL EVERY 6 HOURS PRN
Qty: 90 TABLET | Refills: 1 | Status: SHIPPED | OUTPATIENT
Start: 2023-07-11

## 2023-07-11 RX ORDER — VARENICLINE TARTRATE 0.5 MG/1
TABLET, FILM COATED ORAL
Qty: 11 TABLET | Refills: 0 | Status: SHIPPED | OUTPATIENT
Start: 2023-07-11 | End: 2023-07-18

## 2023-07-14 ENCOUNTER — PATIENT MESSAGE (OUTPATIENT)
Dept: FAMILY MEDICINE CLINIC | Facility: CLINIC | Age: 43
End: 2023-07-14

## 2023-07-17 NOTE — TELEPHONE ENCOUNTER
Typically, but if improved with starting antibiotics, likely infection, but not caught on culture.     Anuradha Reich MD

## 2023-07-17 NOTE — TELEPHONE ENCOUNTER
From: BRENT CRISTOB[facility]  To: Joao Duffy MD  Sent: 7/14/2023 10:04 PM CDT  Subject: Question regarding Liane [address] Dr Tiburcio November. .symptoms have greatly improved. Negative means no infection?   Thank you   felix MENA

## 2023-08-31 DIAGNOSIS — N52.9 ORGANIC ERECTILE DYSFUNCTION: ICD-10-CM

## 2023-09-01 RX ORDER — TADALAFIL 20 MG/1
TABLET ORAL
Qty: 30 TABLET | Refills: 0 | Status: SHIPPED | OUTPATIENT
Start: 2023-09-01

## 2023-09-01 RX ORDER — GABAPENTIN 300 MG/1
300 CAPSULE ORAL 3 TIMES DAILY
Qty: 90 CAPSULE | Refills: 0 | Status: SHIPPED | OUTPATIENT
Start: 2023-09-01

## 2023-09-13 DIAGNOSIS — G25.81 RESTLESS LEG: ICD-10-CM

## 2023-09-13 DIAGNOSIS — G35 MULTIPLE SCLEROSIS (HCC): ICD-10-CM

## 2023-09-13 DIAGNOSIS — M62.838 MUSCLE SPASM: ICD-10-CM

## 2023-09-13 DIAGNOSIS — N52.9 ORGANIC ERECTILE DYSFUNCTION: ICD-10-CM

## 2023-09-13 RX ORDER — TIZANIDINE 4 MG/1
4 TABLET ORAL NIGHTLY
Qty: 30 TABLET | Refills: 2 | Status: SHIPPED | OUTPATIENT
Start: 2023-09-13

## 2023-09-13 RX ORDER — TADALAFIL 20 MG/1
TABLET ORAL
Qty: 30 TABLET | Refills: 0 | Status: SHIPPED | OUTPATIENT
Start: 2023-09-13

## 2023-09-13 RX ORDER — DIAZEPAM 5 MG/1
5 TABLET ORAL EVERY 6 HOURS PRN
Qty: 90 TABLET | Refills: 1 | Status: SHIPPED | OUTPATIENT
Start: 2023-09-13

## 2023-10-13 DIAGNOSIS — N52.9 ORGANIC ERECTILE DYSFUNCTION: ICD-10-CM

## 2023-10-13 DIAGNOSIS — G35 MULTIPLE SCLEROSIS (HCC): ICD-10-CM

## 2023-10-13 DIAGNOSIS — G25.81 RESTLESS LEG: ICD-10-CM

## 2023-10-13 DIAGNOSIS — M62.838 MUSCLE SPASM: ICD-10-CM

## 2023-10-13 DIAGNOSIS — F32.5 MAJOR DEPRESSIVE DISORDER IN FULL REMISSION, UNSPECIFIED WHETHER RECURRENT (HCC): ICD-10-CM

## 2023-10-13 RX ORDER — TADALAFIL 20 MG/1
TABLET ORAL
Qty: 30 TABLET | Refills: 0 | Status: SHIPPED | OUTPATIENT
Start: 2023-10-13

## 2023-10-13 RX ORDER — TIZANIDINE 4 MG/1
4 TABLET ORAL NIGHTLY
Qty: 30 TABLET | Refills: 2 | Status: SHIPPED | OUTPATIENT
Start: 2023-10-13

## 2023-10-13 RX ORDER — DIAZEPAM 5 MG/1
5 TABLET ORAL EVERY 6 HOURS PRN
Qty: 90 TABLET | Refills: 1 | Status: SHIPPED | OUTPATIENT
Start: 2023-10-13

## 2023-10-13 RX ORDER — GABAPENTIN 300 MG/1
300 CAPSULE ORAL 3 TIMES DAILY
Qty: 90 CAPSULE | Refills: 0 | Status: SHIPPED | OUTPATIENT
Start: 2023-10-13

## 2023-10-13 RX ORDER — VENLAFAXINE HYDROCHLORIDE 75 MG/1
75 CAPSULE, EXTENDED RELEASE ORAL DAILY
Qty: 90 CAPSULE | Refills: 1 | Status: SHIPPED | OUTPATIENT
Start: 2023-10-13

## 2023-10-13 NOTE — TELEPHONE ENCOUNTER
.A refill request was received for:  Requested Prescriptions     Pending Prescriptions Disp Refills    venlafaxine ER 75 MG Oral Capsule SR 24 Hr 90 capsule 1     Sig: Take 1 capsule (75 mg total) by mouth daily. gabapentin 300 MG Oral Cap 90 capsule 0     Sig: Take 1 capsule (300 mg total) by mouth 3 (three) times daily. tiZANidine 4 MG Oral Tab 30 tablet 2     Sig: Take 1 tablet (4 mg total) by mouth nightly. diazePAM 5 MG Oral Tab 90 tablet 1     Sig: Take 1 tablet (5 mg total) by mouth every 6 (six) hours as needed for Anxiety.     Tadalafil (CIALIS) 20 MG Oral Tab 30 tablet 0     Sig: TAKE 1 TABLET BY MOUTH DAILY AS NEEDED FOR ERECTILE DYSFUNCTION       Last refill date:     Venlafaxine 5/24/2023  Tizanidine/diazepam/tadalafil 9/13/2023  Gabapentin 9/1/2023  Last office visit: 7/11/2023    Follow up due:  Future Appointments   Date Time Provider Waqar Lvoe   10/17/2023  8:00 AM Jennifer Diamond MD [facility] [address]

## 2023-11-03 ENCOUNTER — TELEPHONE (OUTPATIENT)
Dept: FAMILY MEDICINE CLINIC | Facility: CLINIC | Age: 43
End: 2023-11-03

## 2023-11-03 DIAGNOSIS — R19.5 POSITIVE COLORECTAL CANCER SCREENING USING COLOGUARD TEST: Primary | ICD-10-CM

## 2023-11-03 NOTE — TELEPHONE ENCOUNTER
Let Ara Wynn know that it was positive, I usually see polyps when this is the case, so next step is seeing gastroenterology for colonoscopy to confirm that and remove.     Florentin Longo MD

## 2023-11-13 RX ORDER — GABAPENTIN 300 MG/1
300 CAPSULE ORAL 3 TIMES DAILY
Qty: 90 CAPSULE | Refills: 0 | Status: SHIPPED | OUTPATIENT
Start: 2023-11-13

## 2023-11-13 NOTE — TELEPHONE ENCOUNTER
A refill request was received for:  Requested Prescriptions     Pending Prescriptions Disp Refills    GABAPENTIN 300 MG Oral Cap [Pharmacy Med Name: Gabapentin 300 Mg Cap Nort] 90 capsule 0     Sig: Take 1 capsule (300 mg total) by mouth 3 (three) times daily. Last refill date:10/13/2023       Last office visit:10/17/2023     Follow up due:  No future appointments.

## 2023-11-17 ENCOUNTER — HOSPITAL ENCOUNTER (OUTPATIENT)
Dept: GENERAL RADIOLOGY | Age: 43
Discharge: HOME | End: 2023-11-17
Attending: FAMILY MEDICINE
Payer: MEDICARE

## 2023-11-17 ENCOUNTER — TELEPHONE (OUTPATIENT)
Facility: CLINIC | Age: 43
End: 2023-11-17

## 2023-11-17 DIAGNOSIS — M25.562 ACUTE PAIN OF LEFT KNEE: ICD-10-CM

## 2023-11-17 PROCEDURE — 73560 X-RAY EXAM OF KNEE 1 OR 2: CPT | Performed by: FAMILY MEDICINE

## 2023-11-17 NOTE — TELEPHONE ENCOUNTER
[facility] No appt made as of 11/17/23. Referral expires on 11/14/24.  Referral at [facility] .

## 2023-11-20 DIAGNOSIS — F32.5 MAJOR DEPRESSIVE DISORDER IN FULL REMISSION, UNSPECIFIED WHETHER RECURRENT (HCC): ICD-10-CM

## 2023-11-20 RX ORDER — VENLAFAXINE HYDROCHLORIDE 75 MG/1
CAPSULE, EXTENDED RELEASE ORAL
Qty: 90 CAPSULE | Refills: 1 | Status: SHIPPED | OUTPATIENT
Start: 2023-11-20

## 2023-12-04 DIAGNOSIS — M62.838 MUSCLE SPASM: ICD-10-CM

## 2023-12-04 DIAGNOSIS — N52.9 ORGANIC ERECTILE DYSFUNCTION: ICD-10-CM

## 2023-12-04 DIAGNOSIS — G35 MULTIPLE SCLEROSIS (HCC): ICD-10-CM

## 2023-12-04 DIAGNOSIS — F32.5 MAJOR DEPRESSIVE DISORDER IN FULL REMISSION, UNSPECIFIED WHETHER RECURRENT (HCC): ICD-10-CM

## 2023-12-04 DIAGNOSIS — G25.81 RESTLESS LEG: ICD-10-CM

## 2023-12-04 RX ORDER — DIAZEPAM 5 MG/1
5 TABLET ORAL EVERY 6 HOURS PRN
Qty: 90 TABLET | Refills: 1 | Status: SHIPPED | OUTPATIENT
Start: 2023-12-04

## 2023-12-04 RX ORDER — VENLAFAXINE HYDROCHLORIDE 150 MG/1
150 CAPSULE, EXTENDED RELEASE ORAL DAILY
Qty: 90 CAPSULE | Refills: 1 | Status: SHIPPED | OUTPATIENT
Start: 2023-12-04

## 2023-12-04 RX ORDER — TADALAFIL 20 MG/1
TABLET ORAL
Qty: 30 TABLET | Refills: 0 | Status: SHIPPED | OUTPATIENT
Start: 2023-12-04

## 2023-12-04 RX ORDER — TIZANIDINE 4 MG/1
4 TABLET ORAL NIGHTLY
Qty: 30 TABLET | Refills: 2 | Status: SHIPPED | OUTPATIENT
Start: 2023-12-04

## 2023-12-04 NOTE — TELEPHONE ENCOUNTER
.A refill request was received for:  Requested Prescriptions     Pending Prescriptions Disp Refills    tiZANidine 4 MG Oral Tab 30 tablet 2     Sig: Take 1 tablet (4 mg total) by mouth nightly. diazePAM 5 MG Oral Tab 90 tablet 1     Sig: Take 1 tablet (5 mg total) by mouth every 6 (six) hours as needed for Anxiety. Tadalafil (CIALIS) 20 MG Oral Tab 30 tablet 0     Sig: TAKE 1 TABLET BY MOUTH DAILY AS NEEDED FOR ERECTILE DYSFUNCTION    venlafaxine  MG Oral Capsule SR 24 Hr 90 capsule 1     Sig: Take 1 capsule (150 mg total) by mouth daily. Last refill date:   10/13/2023    Last office visit: 10/17/2023    Follow up due:  No future appointments.

## 2024-01-10 RX ORDER — GABAPENTIN 300 MG/1
300 CAPSULE ORAL 3 TIMES DAILY
Qty: 90 CAPSULE | Refills: 0 | Status: SHIPPED | OUTPATIENT
Start: 2024-01-10

## 2024-01-10 NOTE — TELEPHONE ENCOUNTER
A refill request was received for:  Requested Prescriptions     Pending Prescriptions Disp Refills    gabapentin 300 MG Oral Cap 90 capsule 0     Sig: Take 1 capsule (300 mg total) by mouth 3 (three) times daily.       Last refill date:   11/13/2023    Last office visit: 10/17/2023    Follow up due:  Future Appointments   Date Time Provider Department Center   1/15/2024  2:30 PM Keerthi Luevano APRN [facility]

## 2024-01-15 ENCOUNTER — OFFICE VISIT (OUTPATIENT)
Facility: CLINIC | Age: 44
End: 2024-01-15
Payer: MEDICARE

## 2024-01-15 ENCOUNTER — TELEPHONE (OUTPATIENT)
Facility: CLINIC | Age: 44
End: 2024-01-15

## 2024-01-15 VITALS
BODY MASS INDEX: 29.16 KG/M2 | SYSTOLIC BLOOD PRESSURE: 138 MMHG | HEIGHT: 73 IN | WEIGHT: 220 LBS | DIASTOLIC BLOOD PRESSURE: 82 MMHG

## 2024-01-15 DIAGNOSIS — Z12.11 COLON CANCER SCREENING: Primary | ICD-10-CM

## 2024-01-15 DIAGNOSIS — R19.5 POSITIVE FIT (FECAL IMMUNOCHEMICAL TEST): ICD-10-CM

## 2024-01-15 RX ORDER — AMANTADINE HYDROCHLORIDE 100 MG/1
100 TABLET ORAL DAILY
COMMUNITY
Start: 2024-01-05

## 2024-01-15 NOTE — PATIENT INSTRUCTIONS
1. Schedule colonoscopy with Dr. Kirby, Dr. Carey or Dr. Duffy   Diagnosis: CRC screen, +cologuard   Sedation: MAC  Prep: split dose golytely    2.  bowel prep from pharmacy   You can pick the bowel prep up now and store in a cool, dry place in your home until your scheduled bowel prep start date.    3. Continue all medications as normal for your procedure. DO NOT TAKE: Any form of alcohol, recreational drugs and any forms of erectile dysfunction medications 24 hours prior to procedure.    4. Read all bowel prep instructions carefully. Bowel prep instructions can also be found online at:  www.health.org/giprep     5. AVOID seeds, nuts, popcorn, raw fruits and vegetables for 5 days before procedure    6. If you start any NEW medication after your visit today, please notify us. Certain medications (like iron or weight loss medications) will need to be held before the procedure, or the procedure cannot be performed safely.

## 2024-01-15 NOTE — TELEPHONE ENCOUNTER
Scheduled for:  Colonoscopy [id number]; [id number]  Provider Name:  Dr Carey  Date:  03/07/2024  Location:  [facility]  Sedation:  MAC  Time:  2:00pm (pt is aware to arrive at 1:00pm)  Prep:  Colyte  Meds/Allergies Reconciled?:  Physician reviewed  Diagnosis with codes:  CCS Z12.11; Positive FIT r19.5  Was patient informed to call insurance with codes (Y/N):       Referral sent?:  Referral was sent at the time of electronic surgical scheduling.    EM or [facility] notified?:  I sent an electronic request to Endo Scheduling and received a confirmation today.   Medication Orders:  Pt is aware to NOT take iron pills, herbal meds and diet supplements for 7 days before exam. Also to NOT take any form of alcohol, recreational drugs and any forms of ED meds 24 hours before exam.     Misc Orders:       Further instructions given by staff:  I discussed the prep intructions with the patient in office which HE verbally understood. Copy of instructions was handed to patient as well. Patient was also advised about cancellation policy.

## 2024-01-15 NOTE — H&P
[facility] - Gastroenterology                                                                                                  Clinic History and Physical     Chief Complaint   Patient presents with    Consult     Positve cologaurd; [facility]       Requesting physician or provider: Maurice Hickey MD    HPI:   Cordell Eason is a 44 year old year-old male with medical history including multiple sclerosis, VITA, chronic back pain, who presents for colon cancer screening evaluation following positive cologuard test October 2023    Patient is here today as a referral from his PCP for evaluation prior to undergoing colonoscopy for CRC screening. Patient denies any GI symptoms of nausea, vomiting, heartburn, dyspepsia, dysphagia, hematemesis, abdominal pain, change in bowel habits, constipation, diarrhea, hematochezia, or melena.  Additionally there is no weight loss and no reported chest pain or shortness of breath.     Pt is due for CRC screening. They are electing to pursue colonoscopy at this time.     Last colonoscopy: around 2010 (normal per patient)  Last EGD: none     NSAIDS: advil occasional   Tobacco: daily use  Alcohol: 5 drinks on weekend  Marijuana:none   Illicit drugs:none      GI malignancy: none     No history of adverse reaction to sedation  YES VITA  No anticoagulants  No pacemaker/defibrillator  No pain medications and/or sleep aides    Wt Readings from Last 6 Encounters:   01/15/24 220 lb (99.8 kg)   10/17/23 235 lb (106.6 kg)   07/11/23 241 lb (109.3 kg)   05/23/22 254 lb (115.2 kg)   05/16/22 255 lb (115.7 kg)   04/29/22 255 lb (115.7 kg)          History, Medications, Allergies, ROS:      Past Medical History:   Diagnosis Date    Dermatophytosis of nail     Dermatophytosis of the body     Multiple sclerosis (HCC)     VITA (obstructive sleep apnea) 5/12/2021 PSG    AHI 82.1  REM AHI 0 Supine AHI 57.3 non-supine AHI 85.1    Other and unspecified noninfectious gastroenteritis and colitis(558.9)      Sleep apnea     Unilateral inguinal hernia without obstruction or gangrene 04/22/2022      Past Surgical History:   Procedure Laterality Date    BACK SURGERY  2017    L4-L5 discetomy     HERNIA SURGERY Right 04/29/2022    umbilical and right inguinal hernia repairs.       Family Hx: History reviewed. No pertinent family history.   Social History:   Social History     Socioeconomic History    Marital status: Single   Tobacco Use    Smoking status: Every Day     Packs/day: 1     Types: Cigarettes    Smokeless tobacco: Never   Vaping Use    Vaping Use: Never used   Substance and Sexual Activity    Alcohol use: Yes     Comment: 5 drinks weekly    Drug use: No   Other Topics Concern    Caffeine Concern Yes    Exercise No        Medications (Active prior to today's visit):  Current Outpatient Medications   Medication Sig Dispense Refill    amantadine 100 MG Oral Tab Take 1 tablet (100 mg total) by mouth daily.      polyethylene glycol, PEG 3350-KCl-NaBcb-NaCl-NaSulf, 236 g Oral Recon Soln Take 4,000 mL by mouth As Directed. Take 2,000 mL the night before your procedure and 2,000 mL the morning of your procedure. Take as directed by GI clinic. Okay to substitute for generic. 1 each 0    gabapentin 300 MG Oral Cap Take 1 capsule (300 mg total) by mouth 3 (three) times daily. 90 capsule 0    tiZANidine 4 MG Oral Tab Take 1 tablet (4 mg total) by mouth nightly. 30 tablet 2    diazePAM 5 MG Oral Tab Take 1 tablet (5 mg total) by mouth every 6 (six) hours as needed for Anxiety. 90 tablet 1    Tadalafil (CIALIS) 20 MG Oral Tab TAKE 1 TABLET BY MOUTH DAILY AS NEEDED FOR ERECTILE DYSFUNCTION 30 tablet 0    venlafaxine  MG Oral Capsule SR 24 Hr Take 1 capsule (150 mg total) by mouth daily. 90 capsule 1    VENLAFAXINE ER 75 MG Oral Capsule SR 24 Hr TAKE 1 CAPSULE BY MOUTH EVERY DAY 90 capsule 1    Varenicline Tartrate (CHANTIX) 1 MG Oral Tab Take 1 tablet (1 mg total) by mouth 2 (two) times daily. 180 tablet 0    Ocrelizumab  (OCREVUS IV) Inject into the vein. Every 6 moths         Allergies:  No Known Allergies    ROS:   CONSTITUTIONAL: negative for fevers, chills, sweats  EYES Negative for scleral icterus or redness, and diplopia  HEENT: Negative for hoarseness  RESPIRATORY: Negative for cough and severe shortness of breath  CARDIOVASCULAR: Negative for crushing sub-sternal chest pain  GASTROINTESTINAL: See HPI  GENITOURINARY: Negative for dysuria  MUSCULOSKELETAL: Negative for arthralgias and myalgias  SKIN: Negative for jaundice, rash or pruritus  NEUROLOGICAL: Negative for dizziness and headaches  BEHAVIOR/PSYCH: Negative for psychotic behavior      PHYSICAL EXAM:   Blood pressure 138/82, height 6' 1\" (1.854 m), weight 220 lb (99.8 kg).    GEN: Alert, no acute distress, well-nourished   HEENT: anicteric sclera, neck supple, trachea midline, MMM, no palpable or tender neck or supraclavicular lymph nodes  CV: RRR, the extremities are warm and well perfused   LUNGS: No increased work of breathing, CTAB  ABDOMEN: Soft, symmetrical, non-tender without distention or guarding. No scars or lesions. Aorta is without bruit or visible pulsation. Umbilicus is midline without herniation. Normoactive bowel sounds are present, No masses, hepatomegaly or splenomegaly noted.  MSK: No erythema, no warmth, no swelling of joints  SKIN: No jaundice, no erythema, no rashes, no lesions  HEMATOLOGIC: No bleeding, no bruising  NEURO: Alert and interactive, VELEZ  PSYCH: appropriate mood & affect    Labs/Imaging:     Patient's labs and imaging were reviewed and discussed with patient today.    .  ASSESSMENT/PLAN:   Cordell Eason is a 44 year old year-old male with medical history including multiple sclerosis, VITA, chronic back pain, who presents for colon cancer screening evaluation following positive cologuard test October 2023    # Average Risk screening: patient is considered average risk for colon cancer (No family hx of colon cancer) and it is  appropriate to proceed with screening colonoscopy. Patient is currently asymptomatic and denies diarrhea, hematochezia, thin-stools or weight loss. We discussed risks/benefits/alternatives to procedure, including CT colonography and stool testing, they want to proceed with colonoscopy.  -No anemia noted on lab work July 2023    Recommend:  -Schedule colonoscopy with Dr. Kirby, Dr. Carey or Dr. Duffy   Diagnosis: CRC screen, +cologuard   Sedation: MAC (VITA)  Prep: split dose golytely  -Anti-platelets and anti-coagulants: N/A   -Diabetes meds: N/A     Colonoscopy consent: I have discussed the risks, benefits, and alternatives to colonoscopy with the patient [who demonstrated understanding], including but not limited to the risks of bleeding, infection, pain, as well as the risks of anesthesia and perforation all leading to prolonged hospitalization, surgical intervention. I also specifically mentioned the miss rate of colonoscopy of 5-10% in the best of all circumstances. All questions were answered to the patient’s satisfaction. The patient elected to proceed with colonoscopy with intervention [i.e. polypectomy, etc.] as indicated.    Orders This Visit:  No orders of the defined types were placed in this encounter.      Meds This Visit:  Requested Prescriptions     Signed Prescriptions Disp Refills    polyethylene glycol, PEG 3350-KCl-NaBcb-NaCl-NaSulf, 236 g Oral Recon Soln 1 each 0     Sig: Take 4,000 mL by mouth As Directed. Take 2,000 mL the night before your procedure and 2,000 mL the morning of your procedure. Take as directed by GI clinic. Okay to substitute for generic.       Imaging & Referrals:  None       ATA Thomas    [facility] Gastroenterology  1/15/2024      The dictation was partially prepared using Dragon Medical voice recognition software. As a result, errors may occur. When identified, these errors have been corrected. While every attempt is made to correct errors during  dictation, discrepancies may still exist.

## 2024-01-23 DIAGNOSIS — N52.9 ORGANIC ERECTILE DYSFUNCTION: ICD-10-CM

## 2024-01-23 DIAGNOSIS — M62.838 MUSCLE SPASM: ICD-10-CM

## 2024-01-23 DIAGNOSIS — G35 MULTIPLE SCLEROSIS (HCC): ICD-10-CM

## 2024-01-23 DIAGNOSIS — G25.81 RESTLESS LEG: ICD-10-CM

## 2024-01-24 RX ORDER — TADALAFIL 20 MG/1
TABLET ORAL
Qty: 30 TABLET | Refills: 0 | Status: SHIPPED | OUTPATIENT
Start: 2024-01-24

## 2024-01-24 RX ORDER — DIAZEPAM 5 MG/1
5 TABLET ORAL EVERY 6 HOURS PRN
Qty: 90 TABLET | Refills: 1 | Status: SHIPPED | OUTPATIENT
Start: 2024-01-24

## 2024-01-24 NOTE — TELEPHONE ENCOUNTER
.A refill request was received for:  Requested Prescriptions     Pending Prescriptions Disp Refills    diazePAM 5 MG Oral Tab 90 tablet 1     Sig: Take 1 tablet (5 mg total) by mouth every 6 (six) hours as needed for Anxiety.    Tadalafil (CIALIS) 20 MG Oral Tab 30 tablet 0     Sig: TAKE 1 TABLET BY MOUTH DAILY AS NEEDED FOR ERECTILE DYSFUNCTION       Last refill date:   12/4/2023    Last office visit: 10/17/2023    Follow up due:  Future Appointments   Date Time Provider Department Center   3/7/2024  2:00 PM TARAH JOHANSEN [facility] None

## 2024-02-20 RX ORDER — TIZANIDINE 4 MG/1
4 TABLET ORAL NIGHTLY
Qty: 30 TABLET | Refills: 2 | Status: SHIPPED | OUTPATIENT
Start: 2024-02-20

## 2024-02-20 RX ORDER — GABAPENTIN 300 MG/1
300 CAPSULE ORAL 3 TIMES DAILY
Qty: 90 CAPSULE | Refills: 0 | Status: SHIPPED | OUTPATIENT
Start: 2024-02-20

## 2024-02-20 NOTE — TELEPHONE ENCOUNTER
A refill request was received for:  Requested Prescriptions     Pending Prescriptions Disp Refills    tiZANidine 4 MG Oral Tab 30 tablet 2     Sig: Take 1 tablet (4 mg total) by mouth nightly.    gabapentin 300 MG Oral Cap 90 capsule 0     Sig: Take 1 capsule (300 mg total) by mouth 3 (three) times daily.     Gulshan-1-10-24  Tizanadine- 12-04-23  Last refill date:       Last office visit: 10-17-23     Follow up due:  Future Appointments   Date Time Provider Department Center   3/7/2024  2:00 PM STATSANGEETHA, PROCEDURE [facility] None

## 2024-03-07 ENCOUNTER — HOSPITAL ENCOUNTER (OUTPATIENT)
Age: 44
Setting detail: HOSPITAL OUTPATIENT SURGERY
Discharge: HOME | End: 2024-03-07
Attending: INTERNAL MEDICINE | Admitting: INTERNAL MEDICINE
Payer: MEDICARE

## 2024-03-07 ENCOUNTER — ANESTHESIA (OUTPATIENT)
Dept: ENDOSCOPY | Age: 44
End: 2024-03-07
Payer: MEDICARE

## 2024-03-07 ENCOUNTER — ANESTHESIA EVENT (OUTPATIENT)
Dept: ENDOSCOPY | Age: 44
End: 2024-03-07
Payer: MEDICARE

## 2024-03-07 VITALS
WEIGHT: 205 LBS | DIASTOLIC BLOOD PRESSURE: 104 MMHG | RESPIRATION RATE: 14 BRPM | SYSTOLIC BLOOD PRESSURE: 131 MMHG | BODY MASS INDEX: 27.17 KG/M2 | OXYGEN SATURATION: 100 % | TEMPERATURE: 98 F | HEART RATE: 53 BPM | HEIGHT: 73 IN

## 2024-03-07 DIAGNOSIS — R19.5 POSITIVE FIT (FECAL IMMUNOCHEMICAL TEST): ICD-10-CM

## 2024-03-07 DIAGNOSIS — Z12.11 COLON CANCER SCREENING: ICD-10-CM

## 2024-03-07 PROCEDURE — 99070 SPECIAL SUPPLIES PHYS/QHP: CPT | Performed by: INTERNAL MEDICINE

## 2024-03-07 PROCEDURE — 88305 TISSUE EXAM BY PATHOLOGIST: CPT | Performed by: INTERNAL MEDICINE

## 2024-03-07 PROCEDURE — 45385 COLONOSCOPY W/LESION REMOVAL: CPT | Performed by: INTERNAL MEDICINE

## 2024-03-07 DEVICE — REPLAY HEMOSTASIS CLIP, 11MM SPAN
Type: IMPLANTABLE DEVICE | Status: FUNCTIONAL
Brand: REPLAY

## 2024-03-07 RX ORDER — LIDOCAINE HYDROCHLORIDE 10 MG/ML
INJECTION, SOLUTION EPIDURAL; INFILTRATION; INTRACAUDAL; PERINEURAL AS NEEDED
Status: DISCONTINUED | OUTPATIENT
Start: 2024-03-07 | End: 2024-03-07 | Stop reason: SURG

## 2024-03-07 RX ORDER — GLYCOPYRROLATE 0.2 MG/ML
INJECTION, SOLUTION INTRAMUSCULAR; INTRAVENOUS AS NEEDED
Status: DISCONTINUED | OUTPATIENT
Start: 2024-03-07 | End: 2024-03-07 | Stop reason: SURG

## 2024-03-07 RX ORDER — SODIUM CHLORIDE, SODIUM LACTATE, POTASSIUM CHLORIDE, CALCIUM CHLORIDE 600; 310; 30; 20 MG/100ML; MG/100ML; MG/100ML; MG/100ML
INJECTION, SOLUTION INTRAVENOUS CONTINUOUS
Status: DISCONTINUED | OUTPATIENT
Start: 2024-03-07 | End: 2024-03-07

## 2024-03-07 RX ORDER — NALOXONE HYDROCHLORIDE 0.4 MG/ML
0.08 INJECTION, SOLUTION INTRAMUSCULAR; INTRAVENOUS; SUBCUTANEOUS ONCE AS NEEDED
Status: DISCONTINUED | OUTPATIENT
Start: 2024-03-07 | End: 2024-03-07

## 2024-03-07 RX ADMIN — LIDOCAINE HYDROCHLORIDE 50 MG: 10 INJECTION, SOLUTION EPIDURAL; INFILTRATION; INTRACAUDAL; PERINEURAL at 14:44:00

## 2024-03-07 RX ADMIN — GLYCOPYRROLATE 0.2 MG: 0.2 INJECTION, SOLUTION INTRAMUSCULAR; INTRAVENOUS at 14:44:00

## 2024-03-07 RX ADMIN — SODIUM CHLORIDE, SODIUM LACTATE, POTASSIUM CHLORIDE, CALCIUM CHLORIDE: 600; 310; 30; 20 INJECTION, SOLUTION INTRAVENOUS at 14:44:00

## 2024-03-07 NOTE — H&P
History & Physical Examination    Patient Name: Cordell Eason  MRN: [id number]  [facility]: [id number]  YOB: 1980    Diagnosis: Positive Cologuard testing      Medications Prior to Admission   Medication Sig Dispense Refill Last Dose    tiZANidine 4 MG Oral Tab Take 1 tablet (4 mg total) by mouth nightly. 30 tablet 2 prn    gabapentin 300 MG Oral Cap Take 1 capsule (300 mg total) by mouth 3 (three) times daily. 90 capsule 0 3/7/2024    diazePAM 5 MG Oral Tab Take 1 tablet (5 mg total) by mouth every 6 (six) hours as needed for Anxiety. 90 tablet 1 3/6/2024    Tadalafil (CIALIS) 20 MG Oral Tab TAKE 1 TABLET BY MOUTH DAILY AS NEEDED FOR ERECTILE DYSFUNCTION 30 tablet 0 3/3/2024    amantadine 100 MG Oral Tab Take 1 tablet (100 mg total) by mouth daily.   3/6/2024    venlafaxine  MG Oral Capsule SR 24 Hr Take 1 capsule (150 mg total) by mouth daily. 90 capsule 1 3/7/2024    Ocrelizumab (OCREVUS IV) Inject into the vein. Every 6 moths   prn    polyethylene glycol, PEG 3350-KCl-NaBcb-NaCl-NaSulf, 236 g Oral Recon Soln Take 4,000 mL by mouth As Directed. Take 2,000 mL the night before your procedure and 2,000 mL the morning of your procedure. Take as directed by GI clinic. Okay to substitute for generic. 1 each 0     VENLAFAXINE ER 75 MG Oral Capsule SR 24 Hr TAKE 1 CAPSULE BY MOUTH EVERY DAY 90 capsule 1     Varenicline Tartrate (CHANTIX) 1 MG Oral Tab Take 1 tablet (1 mg total) by mouth 2 (two) times daily. (Patient not taking: Reported on 3/7/2024) 180 tablet 0 Not Taking     Current Facility-Administered Medications   Medication Dose Route Frequency    lactated ringers infusion   Intravenous Continuous       Allergies: No Known Allergies    Past Medical History:   Diagnosis Date    Calculus of kidney     Dermatophytosis of nail     Dermatophytosis of the body     Multiple sclerosis (HCC)     VITA (obstructive sleep apnea) 5/12/2021 PSG    AHI 82.1  REM AHI 0 Supine AHI 57.3 non-supine AHI 85.1    Other  and unspecified noninfectious gastroenteritis and colitis(558.9)     Sleep apnea     Unilateral inguinal hernia without obstruction or gangrene 04/22/2022     Past Surgical History:   Procedure Laterality Date    BACK SURGERY  2017    L4-L5 discetomy     HERNIA SURGERY Right 04/29/2022    umbilical and right inguinal hernia repairs.      History reviewed. No pertinent family history.  Social History     Tobacco Use    Smoking status: Every Day     Packs/day: 1     Types: Cigarettes    Smokeless tobacco: Never   Substance Use Topics    Alcohol use: Yes     Comment: 5 drinks weekends       SYSTEM Check if Review is Normal Check if Physical Exam is Normal If not normal, please explain:   HEENT [X ] [ X]    NECK  [X ] [ X]    HEART [X ] [ X]    LUNGS [X ] [ X]    ABDOMEN [X ] [ X]    EXTREMITIES [X ] [ X]    OTHER        [ x ] I have discussed the risks and benefits and alternatives with the patient/family.  They understand and agree to proceed with plan of care.  [ x ] I have reviewed the History and Physical done within the last 30 days.  Any changes noted above.    Jian Carey MD  3/7/2024  2:42 PM

## 2024-03-07 NOTE — OPERATIVE REPORT
[facility] Endoscopy Report      Date of Procedure:  03/07/24      Preoperative Diagnosis:  Positive Cologuard testing      Postoperative Diagnosis:  1.  Multiple colon polyps  2.  Sigmoid colon diverticulosis  3.  Internal hemorrhoids      Procedure:    Colonoscopy with polypectomy      Surgeon:  Jian Carey M.D.      Anesthesia:  Monitored anesthesia care  Cecal withdrawal time: 33 minutes  EBL:  Insignificant      Brief History:  This is a 44 year old male who presents for a colonoscopy to investigate a positive Cologuard test performed in November 2023.  The patient is asymptomatic from a lower gastrointestinal tract standpoint      Technique:  After informed consent, the patient was placed in the left lateral recumbent position.  Digital rectal examination revealed no palpable intraluminal abnormalities.  An Olympus variable stiffness 190 series HD colonoscope was inserted into the rectum and advanced under direct vision by following the lumen to the terminal ileum.  The colon was examined upon withdrawal in the left lateral recumbent position.      Findings:  The preparation of the colon was good.  The terminal ileum was examined for 5 cm and visually normal.  The ileocecal valve was well preserved. The visualized colonic mucosa from the cecum to the anal verge was normal with an intact vascular pattern.  There were #16 polyps seen within the colon which removed as follows:    1.  In the cecum and ascending colon there were #3 polyps measuring 4-10 mm in size.  All were cold snare excised and retrieved.  2.  In the transverse colon there was a 4 mm sessile polyp which was cold snare excised and retrieved.  3.  In the distal sigmoid (#1) and rectum (#11) there were #12 polyps measuring 3-10 mm in size.  All were cold snare excised and retrieved.  #2 of the sites in the proximal rectum were secured with hemostatic clips (#2 and #3).    Inspection of all sites revealed no evidence of  ongoing bleeding.  There were several diverticula seen in the sigmoid colon without signs of complication.  There were no other colonic polyps, mass lesions, vascular anomalies or signs of inflammation seen.  Retroflexion in the rectum revealed internal hemorrhoids.  The procedure was well tolerated without immediate complication.      Impression:  1.  Multiple colon polyps  2.  Uncomplicated sigmoid colon diverticulosis  3.  Internal hemorrhoids    Recommendations:  1.  Standard postprocedural instructions given.  2.  High-fiber diet for diverticulosis.  3.  Follow-up biopsy results.  4.  Surveillance colonoscopy in 1-3 years depending on the number of adenomatous polyps.        Jian Carey MD  3/7/2024

## 2024-03-07 NOTE — ANESTHESIA POSTPROCEDURE EVALUATION
Patient: Cordell Eason    Procedure Summary       Date: 03/07/24 Room / Location: [facility] ENDOSCOPY 02 / [facility] ENDO    Anesthesia Start: 1442 Anesthesia Stop:     Procedure: COLONOSCOPY Diagnosis:       Colon cancer screening      Positive FIT (fecal immunochemical test)      (diverticulosis, colon polyps, internal hemorrhoids)    Surgeons: Jian Carey MD Anesthesiologist: Kellie Connor MD    Anesthesia Type: general ASA Status: 2            Anesthesia Type: general    Vitals Value Taken Time   /86 03/07/24 1535   Temp 97.5 °F (36.4 °C) 03/07/24 1535   Pulse 56 03/07/24 1535   Resp 16 03/07/24 1535   SpO2 98 % 03/07/24 1535       [facility] AN Post Evaluation:   Patient Evaluated in PACU  Patient Participation: complete - patient participated  Level of Consciousness: awake  Pain Management: adequate  Airway Patency:patent  Dental exam unchanged from preop  Yes    Cardiovascular Status: acceptable  Respiratory Status: acceptable  Postoperative Hydration acceptable      KELLIE CONNOR MD  3/7/2024 3:36 PM

## 2024-03-07 NOTE — DISCHARGE INSTRUCTIONS
Home Care Instructions for Colonoscopy with Sedation    Diet:  - Resume your regular diet as tolerated unless otherwise instructed.  - Start with light meals to minimize bloating.  - Do not drink alcohol today.    Medication:  - If you have questions about resuming your normal medications, please contact your Primary Care Physician.    Activities:  - Take it easy today. Do not return to work today.  - Do not drive today.  - Do not operate any machinery today (including kitchen equipment).    Colonoscopy:  - You may notice some rectal \"spotting\" (a little blood on the toilet tissue) for a day or two after the exam. This is normal.  - If you experience any rectal bleeding (not spotting), persistent tenderness or sharp severe abdominal pains, oral temperature over 100 degrees Fahrenheit, light-headedness or dizziness, or any other problems, contact your doctor.    **If unable to reach your doctor, please go to the [facility] Emergency Room**    - Your referring physician will receive a full report of your examination.  - If you do not hear from your doctor's office within two weeks of your biopsy, please call them for your results.    You may be able to see your laboratory results in [email or web address] between 4 and 7 business days.  In some cases, your physician may not have viewed the results before they are released to [email or web address].  If you have questions regarding your results contact the physician who ordered the test/exam by phone or via [email or web address] by choosing \"Ask a Medical Question.\"

## 2024-03-07 NOTE — ANESTHESIA PREPROCEDURE EVALUATION
Anesthesia PreOp Note    HPI:     Cordell Eason is a 44 year old male who presents for preoperative consultation requested by: iJan Carey MD    Date of Surgery: 3/7/2024    Procedure(s):  COLONOSCOPY  Indication: Colon cancer screening/ Positive FIT (fecal immunochemical test)    Relevant Problems   No relevant active problems       NPO:  Last Liquid Consumption Date: 03/07/24  Last Liquid Consumption Time: 1100  Last Solid Consumption Date: 03/05/24  Last Solid Consumption Time: 1800  Last Liquid Consumption Date: 03/07/24          History Review:  Patient Active Problem List    Diagnosis Date Noted    Slow transit constipation 04/22/2022    VITA (obstructive sleep apnea)     Multiple sclerosis (HCC) 02/09/2021    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    Psychosexual dysfunction with inhibited sexual excitement 06/29/2012    Major depressive disorder in full remission ([facility]) 06/29/2012       Past Medical History:   Diagnosis Date    Calculus of kidney     Dermatophytosis of nail     Dermatophytosis of the body     Multiple sclerosis (HCC)     VITA (obstructive sleep apnea) 5/12/2021 PSG    AHI 82.1  REM AHI 0 Supine AHI 57.3 non-supine AHI 85.1    Other and unspecified noninfectious gastroenteritis and colitis(558.9)     Sleep apnea     Unilateral inguinal hernia without obstruction or gangrene 04/22/2022       Past Surgical History:   Procedure Laterality Date    BACK SURGERY  2017    L4-L5 discetomy     HERNIA SURGERY Right 04/29/2022    umbilical and right inguinal hernia repairs.        Medications Prior to Admission   Medication Sig Dispense Refill Last Dose    tiZANidine 4 MG Oral Tab Take 1 tablet (4 mg total) by mouth nightly. 30 tablet 2 prn    gabapentin 300 MG Oral Cap Take 1 capsule (300 mg total) by mouth 3 (three) times daily. 90 capsule 0 3/7/2024    diazePAM 5 MG Oral Tab Take 1 tablet (5 mg total) by mouth every 6 (six) hours as needed for Anxiety. 90 tablet 1 3/6/2024    Tadalafil  (CIALIS) 20 MG Oral Tab TAKE 1 TABLET BY MOUTH DAILY AS NEEDED FOR ERECTILE DYSFUNCTION 30 tablet 0 3/3/2024    amantadine 100 MG Oral Tab Take 1 tablet (100 mg total) by mouth daily.   3/6/2024    venlafaxine  MG Oral Capsule SR 24 Hr Take 1 capsule (150 mg total) by mouth daily. 90 capsule 1 3/7/2024    Ocrelizumab (OCREVUS IV) Inject into the vein. Every 6 moths   prn    polyethylene glycol, PEG 3350-KCl-NaBcb-NaCl-NaSulf, 236 g Oral Recon Soln Take 4,000 mL by mouth As Directed. Take 2,000 mL the night before your procedure and 2,000 mL the morning of your procedure. Take as directed by GI clinic. Okay to substitute for generic. 1 each 0     VENLAFAXINE ER 75 MG Oral Capsule SR 24 Hr TAKE 1 CAPSULE BY MOUTH EVERY DAY 90 capsule 1     Varenicline Tartrate (CHANTIX) 1 MG Oral Tab Take 1 tablet (1 mg total) by mouth 2 (two) times daily. (Patient not taking: Reported on 3/7/2024) 180 tablet 0 Not Taking     Current Facility-Administered Medications Ordered in Epic   Medication Dose Route Frequency Provider Last Rate Last Admin    lactated ringers infusion   Intravenous Continuous Jian Carey MD         No current [facility]-ordered outpatient medications on file.       No Known Allergies    History reviewed. No pertinent family history.  Social History     Socioeconomic History    Marital status: Single   Tobacco Use    Smoking status: Every Day     Packs/day: 1     Types: Cigarettes    Smokeless tobacco: Never   Vaping Use    Vaping Use: Never used   Substance and Sexual Activity    Alcohol use: Yes     Comment: 5 drinks weekends    Drug use: No   Other Topics Concern    Caffeine Concern Yes    Exercise No       Available pre-op labs reviewed.             Vital Signs:  Body mass index is 27.05 kg/m².   height is 1.854 m (6' 1\") and weight is 93 kg (205 lb). His blood pressure is 132/96 (abnormal) and his pulse is 55. His respiration is 15 and oxygen saturation is 97%.   Vitals:    02/26/24 1654 03/07/24  1334   BP:  (!) 132/96   Pulse:  55   Resp:  15   SpO2:  97%   Weight: 93 kg (205 lb)    Height: 1.854 m (6' 1\")         Anesthesia Evaluation     Patient summary reviewed and Nursing notes reviewed    Airway   Mallampati: I  TM distance: >3 FB  Neck ROM: full  Dental - Dentition appears grossly intact     Pulmonary - normal exam   (+) sleep apnea  Cardiovascular - negative ROS and normal exam    Neuro/Psych    (+)   depression      Comments: Multiple sclerosis    GI/Hepatic/Renal - negative ROS     Endo/Other - negative ROS   Abdominal   (+) obese                 Anesthesia Plan:   ASA:  2  Plan:   General  Informed Consent Plan and Risks Discussed With:  Patient  Discussed plan with:  Surgeon      I have informed Cordell Eason and/or legal guardian or family member of the nature of the anesthetic plan, benefits, risks including possible dental damage if relevant, major complications, and any alternative forms of anesthetic management.   All of the patient's questions were answered to the best of my ability. The patient desires the anesthetic management as planned.  SYED CHAND MD  3/7/2024 2:08 PM  Present on Admission:  **None**

## 2024-03-07 NOTE — PROGRESS NOTES
Pt came into pre op with bp of 134/102. Per Dr. Aurelio pierce to discharge home post op with bp of 131/104. Patient was asymptomatic at time of discharge and is aware to follow up with his primary regarding his bp and if onset of new symptoms occur post op.

## 2024-03-08 DIAGNOSIS — Z86.010 PERSONAL HISTORY OF COLONIC POLYPS: Primary | ICD-10-CM

## 2024-03-11 ENCOUNTER — TELEPHONE (OUTPATIENT)
Facility: CLINIC | Age: 44
End: 2024-03-11

## 2024-03-11 NOTE — TELEPHONE ENCOUNTER
----- Message from Jian Carey MD sent at 3/8/2024  7:05 PM CST -----  I left a message on the patient's personal voicemail.  He had #16 adenomatous polyps removed (most were tubular adenomata and some serrated adenomata).  I discussed the significance and the fact that the number of polyps identified especially at a young age is unusual.  Uncomplicated diverticulosis was present.  The patient was also hypertensive yesterday.    I have recommended the followin.  Surveillance colonoscopy in 1 year.  2.  Genetic testing.  I reviewed the rationale.  I have asked the patient to contact me if he wishes to proceed.  This testing may aid in determining frequency of colonoscopy examinations and may also aid family members as well.  3.  High-fiber diet for diverticulosis.  4.  I reiterated the recommendation that I made yesterday that the patient contact his primary care physician regarding his elevated blood pressure.    I have asked the patient to contact me with any questions.    GI RNs: Please enter colonoscopy recall for 1 year.

## 2024-03-11 NOTE — TELEPHONE ENCOUNTER
1  year colonoscopy recall entered. Health maintenance updated.    Colonoscopy done on 3/7/24 and next due on 3/7/25.

## 2024-03-15 ENCOUNTER — TELEPHONE (OUTPATIENT)
Dept: GENETICS | Facility: HOSPITAL | Age: 44
End: 2024-03-15

## 2024-03-18 ENCOUNTER — TELEPHONE (OUTPATIENT)
Dept: GENETICS | Facility: HOSPITAL | Age: 44
End: 2024-03-18

## 2024-03-20 ENCOUNTER — TELEPHONE (OUTPATIENT)
Dept: GENETICS | Facility: HOSPITAL | Age: 44
End: 2024-03-20

## 2024-04-02 DIAGNOSIS — M62.838 MUSCLE SPASM: ICD-10-CM

## 2024-04-02 DIAGNOSIS — G25.81 RESTLESS LEG: ICD-10-CM

## 2024-04-02 DIAGNOSIS — G35 MULTIPLE SCLEROSIS (HCC): ICD-10-CM

## 2024-04-02 DIAGNOSIS — F32.5 MAJOR DEPRESSIVE DISORDER IN FULL REMISSION, UNSPECIFIED WHETHER RECURRENT (HCC): ICD-10-CM

## 2024-04-02 DIAGNOSIS — N52.9 ORGANIC ERECTILE DYSFUNCTION: ICD-10-CM

## 2024-04-02 RX ORDER — DIAZEPAM 5 MG/1
5 TABLET ORAL EVERY 6 HOURS PRN
Qty: 90 TABLET | Refills: 1 | Status: SHIPPED | OUTPATIENT
Start: 2024-04-02

## 2024-04-02 RX ORDER — TADALAFIL 20 MG/1
TABLET ORAL
Qty: 30 TABLET | Refills: 0 | Status: SHIPPED | OUTPATIENT
Start: 2024-04-02

## 2024-04-02 RX ORDER — GABAPENTIN 300 MG/1
300 CAPSULE ORAL 3 TIMES DAILY
Qty: 90 CAPSULE | Refills: 0 | Status: SHIPPED | OUTPATIENT
Start: 2024-04-02

## 2024-04-02 RX ORDER — TIZANIDINE 4 MG/1
4 TABLET ORAL NIGHTLY
Qty: 30 TABLET | Refills: 2 | Status: SHIPPED | OUTPATIENT
Start: 2024-04-02

## 2024-04-02 RX ORDER — VENLAFAXINE HYDROCHLORIDE 150 MG/1
150 CAPSULE, EXTENDED RELEASE ORAL DAILY
Qty: 90 CAPSULE | Refills: 1 | Status: SHIPPED | OUTPATIENT
Start: 2024-04-02

## 2024-04-02 NOTE — TELEPHONE ENCOUNTER
A refill request was received for:  Requested Prescriptions     Pending Prescriptions Disp Refills    venlafaxine  MG Oral Capsule SR 24 Hr 90 capsule 1     Sig: Take 1 capsule (150 mg total) by mouth daily.    diazePAM 5 MG Oral Tab 90 tablet 1     Sig: Take 1 tablet (5 mg total) by mouth every 6 (six) hours as needed for Anxiety.    Tadalafil (CIALIS) 20 MG Oral Tab 30 tablet 0     Sig: TAKE 1 TABLET BY MOUTH DAILY AS NEEDED FOR ERECTILE DYSFUNCTION    tiZANidine 4 MG Oral Tab 30 tablet 2     Sig: Take 1 tablet (4 mg total) by mouth nightly.    gabapentin 300 MG Oral Cap 90 capsule 0     Sig: Take 1 capsule (300 mg total) by mouth 3 (three) times daily.       Last refill date:1/2024       Last office visit:10/2023     Follow up due:  No future appointments.

## 2024-04-29 RX ORDER — GABAPENTIN 300 MG/1
300 CAPSULE ORAL 3 TIMES DAILY
Qty: 90 CAPSULE | Refills: 0 | Status: SHIPPED | OUTPATIENT
Start: 2024-04-29

## 2024-04-29 NOTE — TELEPHONE ENCOUNTER
.A refill request was received for:  Requested Prescriptions     Pending Prescriptions Disp Refills    GABAPENTIN 300 MG Oral Cap [Pharmacy Med Name: Gabapentin 300 Mg Cap Nort] 90 capsule 0     Sig: Take 1 capsule (300 mg total) by mouth 3 (three) times daily.       Last refill date:   4/2/2024    Last office visit: 10/17/2023    Follow up due:  No future appointments.

## 2024-06-03 RX ORDER — GABAPENTIN 300 MG/1
300 CAPSULE ORAL 3 TIMES DAILY
Qty: 90 CAPSULE | Refills: 0 | Status: SHIPPED | OUTPATIENT
Start: 2024-06-03

## 2024-06-03 NOTE — TELEPHONE ENCOUNTER
.A refill request was received for:  Requested Prescriptions     Pending Prescriptions Disp Refills    GABAPENTIN 300 MG Oral Cap [Pharmacy Med Name: Gabapentin 300 Mg Cap Nort] 90 capsule 0     Sig: Take 1 capsule (300 mg total) by mouth 3 (three) times daily.       Last refill date:   4/29/2024    Last office visit: 10/17/2024    Follow up due:  No future appointments.

## 2024-08-13 DIAGNOSIS — F32.5 MAJOR DEPRESSIVE DISORDER IN FULL REMISSION, UNSPECIFIED WHETHER RECURRENT (HCC): ICD-10-CM

## 2024-08-13 DIAGNOSIS — G25.81 RESTLESS LEG: ICD-10-CM

## 2024-08-13 DIAGNOSIS — G35 MULTIPLE SCLEROSIS (HCC): ICD-10-CM

## 2024-08-13 DIAGNOSIS — M62.838 MUSCLE SPASM: ICD-10-CM

## 2024-08-13 DIAGNOSIS — N52.9 ORGANIC ERECTILE DYSFUNCTION: ICD-10-CM

## 2024-08-13 RX ORDER — VENLAFAXINE HYDROCHLORIDE 150 MG/1
150 CAPSULE, EXTENDED RELEASE ORAL DAILY
Qty: 90 CAPSULE | Refills: 1 | Status: SHIPPED | OUTPATIENT
Start: 2024-08-13

## 2024-08-13 RX ORDER — TADALAFIL 20 MG/1
TABLET ORAL
Qty: 30 TABLET | Refills: 0 | Status: SHIPPED | OUTPATIENT
Start: 2024-08-13

## 2024-08-13 RX ORDER — DIAZEPAM 5 MG/1
5 TABLET ORAL EVERY 6 HOURS PRN
Qty: 90 TABLET | Refills: 1 | Status: SHIPPED | OUTPATIENT
Start: 2024-08-13

## 2024-08-13 RX ORDER — GABAPENTIN 300 MG/1
300 CAPSULE ORAL 3 TIMES DAILY
Qty: 90 CAPSULE | Refills: 0 | Status: SHIPPED | OUTPATIENT
Start: 2024-08-13

## 2024-08-13 RX ORDER — TIZANIDINE 4 MG/1
4 TABLET ORAL NIGHTLY
Qty: 30 TABLET | Refills: 2 | Status: SHIPPED | OUTPATIENT
Start: 2024-08-13

## 2024-08-13 NOTE — TELEPHONE ENCOUNTER
A refill request was received for:  Requested Prescriptions     Pending Prescriptions Disp Refills    venlafaxine  MG Oral Capsule SR 24 Hr 90 capsule 1     Sig: Take 1 capsule (150 mg total) by mouth daily.    diazePAM 5 MG Oral Tab 90 tablet 1     Sig: Take 1 tablet (5 mg total) by mouth every 6 (six) hours as needed for Anxiety.    Tadalafil (CIALIS) 20 MG Oral Tab 30 tablet 0     Sig: TAKE 1 TABLET BY MOUTH DAILY AS NEEDED FOR ERECTILE DYSFUNCTION    tiZANidine 4 MG Oral Tab 30 tablet 2     Sig: Take 1 tablet (4 mg total) by mouth nightly.    gabapentin 300 MG Oral Cap 90 capsule 0     Sig: Take 1 capsule (300 mg total) by mouth 3 (three) times daily.       Last refill date:4/2024       Last office visit:10/2023     Follow up due:  No future appointments.

## 2024-09-13 ENCOUNTER — TELEPHONE (OUTPATIENT)
Dept: FAMILY MEDICINE CLINIC | Facility: CLINIC | Age: 44
End: 2024-09-13

## 2024-09-13 NOTE — TELEPHONE ENCOUNTER
Patient last seen by Dr Hickey on 10/17/23  Doctor, do  you know anything about this injection for MS?  Please advise.  Thank you.

## 2024-09-13 NOTE — TELEPHONE ENCOUNTER
Pt sched for ocrevus infusion on 9/16.  The infusion center said there is no order and pt would need to see his doctor.  Pt gets the infusions every 6 mos for his MS.  Pls call to discuss asap

## 2024-09-13 NOTE — TELEPHONE ENCOUNTER
Patient states he will contact his neurologist and arrange this medication through specialist.  Agreed to note.

## 2024-10-16 DIAGNOSIS — N52.9 ORGANIC ERECTILE DYSFUNCTION: ICD-10-CM

## 2024-10-16 DIAGNOSIS — G25.81 RESTLESS LEG: ICD-10-CM

## 2024-10-16 DIAGNOSIS — G35 MULTIPLE SCLEROSIS (HCC): ICD-10-CM

## 2024-10-16 DIAGNOSIS — M62.838 MUSCLE SPASM: ICD-10-CM

## 2024-10-17 NOTE — TELEPHONE ENCOUNTER
A refill request was received for:  Requested Prescriptions     Pending Prescriptions Disp Refills    Tadalafil (CIALIS) 20 MG Oral Tab 30 tablet 0     Sig: TAKE 1 TABLET BY MOUTH DAILY AS NEEDED FOR ERECTILE DYSFUNCTION       Last refill date:8/13/2024     My chart sent for due appointment  Last office visit:10/17/2023     Follow up due:  No future appointments.

## 2024-10-18 RX ORDER — DIAZEPAM 5 MG/1
5 TABLET ORAL EVERY 6 HOURS PRN
Qty: 90 TABLET | Refills: 1 | Status: SHIPPED | OUTPATIENT
Start: 2024-10-18

## 2024-10-18 RX ORDER — TADALAFIL 20 MG/1
TABLET ORAL
Qty: 30 TABLET | Refills: 0 | Status: SHIPPED | OUTPATIENT
Start: 2024-10-18

## 2024-10-18 NOTE — TELEPHONE ENCOUNTER
.A refill request was received for:  Requested Prescriptions     Pending Prescriptions Disp Refills    diazePAM 5 MG Oral Tab 90 tablet 1     Sig: Take 1 tablet (5 mg total) by mouth every 6 (six) hours as needed for Anxiety.       Last refill date:   8/13/24    Last office visit: 10/17/23    Follow up due:  No future appointments.

## 2024-12-28 ENCOUNTER — PATIENT MESSAGE (OUTPATIENT)
Facility: CLINIC | Age: 44
End: 2024-12-28

## 2025-01-16 DIAGNOSIS — G25.81 RESTLESS LEG: ICD-10-CM

## 2025-01-16 DIAGNOSIS — G35 MULTIPLE SCLEROSIS (HCC): ICD-10-CM

## 2025-01-16 DIAGNOSIS — M62.838 MUSCLE SPASM: ICD-10-CM

## 2025-01-16 DIAGNOSIS — N52.9 ORGANIC ERECTILE DYSFUNCTION: ICD-10-CM

## 2025-01-17 RX ORDER — DIAZEPAM 5 MG/1
5 TABLET ORAL EVERY 6 HOURS PRN
Qty: 90 TABLET | Refills: 1 | OUTPATIENT
Start: 2025-01-17

## 2025-01-17 RX ORDER — TADALAFIL 20 MG/1
TABLET ORAL
Qty: 30 TABLET | Refills: 0 | OUTPATIENT
Start: 2025-01-17

## 2025-01-21 ENCOUNTER — PATIENT MESSAGE (OUTPATIENT)
Facility: CLINIC | Age: 45
End: 2025-01-21

## 2025-01-22 DIAGNOSIS — G25.81 RESTLESS LEG: ICD-10-CM

## 2025-01-22 DIAGNOSIS — M62.838 MUSCLE SPASM: ICD-10-CM

## 2025-01-22 DIAGNOSIS — G35 MULTIPLE SCLEROSIS (HCC): ICD-10-CM

## 2025-01-22 DIAGNOSIS — N52.9 ORGANIC ERECTILE DYSFUNCTION: ICD-10-CM

## 2025-01-22 RX ORDER — DIAZEPAM 5 MG/1
5 TABLET ORAL EVERY 6 HOURS PRN
Qty: 90 TABLET | Refills: 1 | OUTPATIENT
Start: 2025-01-22

## 2025-01-22 RX ORDER — TADALAFIL 20 MG/1
TABLET ORAL
Qty: 30 TABLET | Refills: 0 | OUTPATIENT
Start: 2025-01-22

## 2025-01-23 RX ORDER — DIAZEPAM 5 MG/1
5 TABLET ORAL EVERY 6 HOURS PRN
Qty: 90 TABLET | Refills: 1 | Status: SHIPPED | OUTPATIENT
Start: 2025-01-23

## 2025-01-23 NOTE — TELEPHONE ENCOUNTER
.A refill request was received for:  Requested Prescriptions     Refused Prescriptions Disp Refills    tiZANidine 4 MG Oral Tab 30 tablet 2     Sig: Take 1 tablet (4 mg total) by mouth nightly.     Refused By: VALE VELAZQUEZ     Reason for Refusal: Appt required, please call patient    diazePAM 5 MG Oral Tab 90 tablet 1     Sig: Take 1 tablet (5 mg total) by mouth every 6 (six) hours as needed for Anxiety.     Refused By: VALE VELAZQUEZ     Reason for Refusal: Appt required, please call patient    Tadalafil (CIALIS) 20 MG Oral Tab 30 tablet 0     Sig: TAKE 1 TABLET BY MOUTH DAILY AS NEEDED FOR ERECTILE DYSFUNCTION     Refused By: VALE VELAZQUEZ     Reason for Refusal: Appt required, please call patient       Last refill date:   10/18/24    Last office visit: 10/17/23    Follow up due:  Future Appointments   Date Time Provider Department Center   2/19/2025  1:30 PM Maurice Hickey MD [facility] [address] [facility]

## 2025-01-27 ENCOUNTER — TELEPHONE (OUTPATIENT)
Facility: CLINIC | Age: 45
End: 2025-01-27

## 2025-01-27 NOTE — TELEPHONE ENCOUNTER
Patient outreach message received:    1  year colonoscopy recall entered. Health maintenance updated.     Colonoscopy done on 3/7/24 and next due on 3/7/25.    Recall reminder letter sent out to patient via [employer].

## 2025-02-13 DIAGNOSIS — F32.5 MAJOR DEPRESSIVE DISORDER IN FULL REMISSION, UNSPECIFIED WHETHER RECURRENT: ICD-10-CM

## 2025-02-13 RX ORDER — VENLAFAXINE HYDROCHLORIDE 150 MG/1
150 CAPSULE, EXTENDED RELEASE ORAL DAILY
Qty: 30 CAPSULE | Refills: 0 | Status: SHIPPED | OUTPATIENT
Start: 2025-02-13

## 2025-02-13 NOTE — TELEPHONE ENCOUNTER
A refill request was received for:  Requested Prescriptions     Pending Prescriptions Disp Refills    VENLAFAXINE  MG Oral Capsule SR 24 Hr [Pharmacy Med Name: Venlafaxine Hydrochloride Er 24hr 150 Mg Cap Gran] 90 capsule 0     Sig: Take 1 capsule (150 mg total) by mouth daily.       Last refill date:8/2024     Has appt booked 2/19/2025  Last office visit:2023     Follow up due:  Future Appointments   Date Time Provider Department Center   2/19/2025  1:30 PM Maurice Hickey MD [facility]

## 2025-02-19 ENCOUNTER — OFFICE VISIT (OUTPATIENT)
Dept: FAMILY MEDICINE CLINIC | Facility: CLINIC | Age: 45
End: 2025-02-19
Payer: MEDICARE

## 2025-02-19 VITALS
SYSTOLIC BLOOD PRESSURE: 126 MMHG | HEART RATE: 87 BPM | RESPIRATION RATE: 16 BRPM | OXYGEN SATURATION: 98 % | DIASTOLIC BLOOD PRESSURE: 88 MMHG | HEIGHT: 73 IN | BODY MASS INDEX: 27.7 KG/M2 | WEIGHT: 209 LBS

## 2025-02-19 DIAGNOSIS — N52.9 ORGANIC ERECTILE DYSFUNCTION: ICD-10-CM

## 2025-02-19 DIAGNOSIS — Z00.00 WELLNESS EXAMINATION: Primary | ICD-10-CM

## 2025-02-19 DIAGNOSIS — Z71.6 ENCOUNTER FOR TOBACCO USE CESSATION COUNSELING: ICD-10-CM

## 2025-02-19 DIAGNOSIS — R73.09 ELEVATED GLUCOSE: ICD-10-CM

## 2025-02-19 DIAGNOSIS — Z13.220 LIPID SCREENING: ICD-10-CM

## 2025-02-19 DIAGNOSIS — Z13.29 THYROID DISORDER SCREEN: ICD-10-CM

## 2025-02-19 DIAGNOSIS — G35 MULTIPLE SCLEROSIS (HCC): ICD-10-CM

## 2025-02-19 DIAGNOSIS — Z13.0 SCREENING FOR DEFICIENCY ANEMIA: ICD-10-CM

## 2025-02-19 PROCEDURE — 3008F BODY MASS INDEX DOCD: CPT | Performed by: FAMILY MEDICINE

## 2025-02-19 PROCEDURE — 3079F DIAST BP 80-89 MM HG: CPT | Performed by: FAMILY MEDICINE

## 2025-02-19 PROCEDURE — 3074F SYST BP LT 130 MM HG: CPT | Performed by: FAMILY MEDICINE

## 2025-02-19 PROCEDURE — 99396 PREV VISIT EST AGE 40-64: CPT | Performed by: FAMILY MEDICINE

## 2025-02-19 RX ORDER — TADALAFIL 20 MG/1
TABLET ORAL
Qty: 30 TABLET | Refills: 0 | Status: SHIPPED | OUTPATIENT
Start: 2025-02-19

## 2025-02-19 NOTE — PROGRESS NOTES
HPI:   Cordell Eason is a 45 year old male who presents for an Annual Health Visit.     Cordell is following up on general health.    Struggles with his lower body, left leg can go out, nothing new, with his weight loss, very thin, despite exercise struggles to get them back to stronger.    Left leg will go numb, and is following with neurology regularly. Has done PT for years in his history.    Hx of lumbar spinal issues and MS.    No major changes in life setup        Allergies:   No Known Allergies    CURRENT MEDICATIONS   Current Outpatient Medications   Medication Sig Dispense Refill    Tadalafil (CIALIS) 20 MG Oral Tab TAKE 1 TABLET BY MOUTH DAILY AS NEEDED FOR ERECTILE DYSFUNCTION 30 tablet 0    VENLAFAXINE  MG Oral Capsule SR 24 Hr Take 1 capsule (150 mg total) by mouth daily. 30 capsule 0    diazePAM 5 MG Oral Tab Take 1 tablet (5 mg total) by mouth every 6 (six) hours as needed for Anxiety. 90 tablet 1    tiZANidine 4 MG Oral Tab Take 1 tablet (4 mg total) by mouth nightly. 30 tablet 2    amantadine 100 MG Oral Tab Take 1 tablet (100 mg total) by mouth daily.      Varenicline Tartrate (CHANTIX) 1 MG Oral Tab Take 1 tablet (1 mg total) by mouth 2 (two) times daily. 180 tablet 0    Ocrelizumab (OCREVUS IV) Inject into the vein. Every 6 moths      gabapentin 300 MG Oral Cap Take 1 capsule (300 mg total) by mouth 3 (three) times daily. (Patient not taking: Reported on 2/19/2025) 90 capsule 0      HISTORICAL INFORMATION   Past Medical History:    Calculus of kidney    Dermatophytosis of nail    Dermatophytosis of the body    Multiple sclerosis (HCC)    VITA (obstructive sleep apnea)    AHI 82.1  REM AHI 0 Supine AHI 57.3 non-supine AHI 85.1    Other and unspecified noninfectious gastroenteritis and colitis(558.9)    Sleep apnea    Unilateral inguinal hernia without obstruction or gangrene      Past Surgical History:   Procedure Laterality Date    Back surgery  2017    L4-L5 discetomy     Colonoscopy  N/A 03/07/2024    Dr. Carey, diverticulosis, colon polyps    Colonoscopy N/A 3/7/2024    Procedure: COLONOSCOPY;  Surgeon: Jian Carey MD;  Location: [facility] ENDO    Hernia surgery Right 04/29/2022    umbilical and right inguinal hernia repairs.       History reviewed. No pertinent family history.   SOCIAL HISTORY   Social History     Socioeconomic History    Marital status: Single   Tobacco Use    Smoking status: Every Day     Current packs/day: 1.00     Types: Cigarettes    Smokeless tobacco: Never   Vaping Use    Vaping status: Never Used   Substance and Sexual Activity    Alcohol use: Yes     Comment: 5 drinks weekends    Drug use: No   Other Topics Concern    Caffeine Concern Yes    Exercise No     Social Drivers of Health      [facility], [facility]    Housing Stability     Social History     Social History Narrative    Not on file        REVIEW OF SYSTEMS:     Constitutional: negative  Eyes: negative  ENT: negative  Respiratory: negative  Cardiovascular: negative  Gastrointestinal:  heartburn better with tums  Integument/Breast: negative  Genitourinary: negative  Heme/Lymph: negative  Musculoskeletal: negative  Neurological:  see HPI  Psych:  has anxiety and depression, worsened, but managable  Endocrine: negative  Allergic/Immune: negative    EXAM:   BP (!) 140/98   Pulse 87   Resp 16   Ht 6' 1\" (1.854 m)   Wt 209 lb (94.8 kg)   SpO2 98%   BMI 27.57 kg/m²    Wt Readings from Last 6 Encounters:   02/19/25 209 lb (94.8 kg)   02/26/24 205 lb (93 kg)   01/15/24 220 lb (99.8 kg)   10/17/23 235 lb (106.6 kg)   07/11/23 241 lb (109.3 kg)   05/23/22 254 lb (115.2 kg)     Body mass index is 27.57 kg/m².    General: alert, appears stated age, and cooperative  Head: Normocephalic, without obvious abnormality, atraumatic  Eyes: conjunctivae/corneas clear. PERRL, EOM's intact. Fundi benign.  Ears: normal TM's and external ear canals both  ears  Nose: Nares normal. Septum midline. Mucosa normal. No drainage or sinus tenderness.  Throat: lips, mucosa, and tongue normal; teeth and gums normal  Neck: no adenopathy, no carotid bruit, no JVD, supple, symmetrical, trachea midline, and thyroid not enlarged, symmetric, no tenderness/mass/nodules  Heart: S1, S2 normal, no murmur, click, rub or gallop, regular rate and rhythm  Lungs: clear to auscultation bilaterally  Chest wall: no tenderness  Abdomen: soft, non-tender; bowel sounds normal; no masses,  no organomegaly  : deferred  Back: symmetric, no curvature. ROM normal. No CVA tenderness.  Extremities: extremities normal, atraumatic, no cyanosis or edema  Pulses: 2+ and symmetric  Skin: Skin color, texture, turgor normal. No rashes or lesions  Lymph Nodes: Cervical, supraclavicular, and axillary nodes normal.  Neurologic: Grossly normal    ASSESSMENT AND PLAN:   Cordell was seen today for physical.    Diagnoses and all orders for this visit:    Wellness examination  -     Comp Metabolic Panel (14); Future  -     CBC With Differential With Platelet; Future  -     Hemoglobin A1C; Future  -     Lipid Panel; Future  -     TSH W Reflex To Free T4; Future    Organic erectile dysfunction  -     Tadalafil (CIALIS) 20 MG Oral Tab; TAKE 1 TABLET BY MOUTH DAILY AS NEEDED FOR ERECTILE DYSFUNCTION    Elevated glucose  -     Hemoglobin A1C; Future    Lipid screening  -     Lipid Panel; Future    Screening for deficiency anemia  -     CBC With Differential With Platelet; Future    Thyroid disorder screen  -     TSH W Reflex To Free T4; Future    Multiple sclerosis (HCC) - Has chronic issues related to this, and known diagnosis based on previous MRI showing tell tale signs of MS with hx of demyelination and left over hyperintense foci. He continues to follow with neurology regularly    Encounter for tobacco use cessation counseling  -     Smoking Cessation less than 3 minutes    Overall stable, but with ongoing lower  extremity issus, I'd consider PM&R if he desires, but does have regular follow up with neurology    There are no Patient Instructions on file for this visit.    The patient indicates understanding of these issues and agrees to the plan.    Problem List:  Patient Active Problem List   Diagnosis    Psychosexual dysfunction with inhibited sexual excitement    Major depressive disorder in full remission    Organic erectile dysfunction    Multiple sclerosis (HCC)    Slow transit constipation    VITA (obstructive sleep apnea)       Maurice Hickey MD  2/19/2025  1:31 PM

## 2025-03-17 DIAGNOSIS — F32.5 MAJOR DEPRESSIVE DISORDER IN FULL REMISSION, UNSPECIFIED WHETHER RECURRENT: ICD-10-CM

## 2025-03-17 RX ORDER — VENLAFAXINE HYDROCHLORIDE 150 MG/1
150 CAPSULE, EXTENDED RELEASE ORAL DAILY
Qty: 30 CAPSULE | Refills: 0 | Status: SHIPPED | OUTPATIENT
Start: 2025-03-17

## 2025-04-15 DIAGNOSIS — N52.9 ORGANIC ERECTILE DYSFUNCTION: ICD-10-CM

## 2025-04-15 DIAGNOSIS — G25.81 RESTLESS LEG: ICD-10-CM

## 2025-04-15 DIAGNOSIS — M62.838 MUSCLE SPASM: ICD-10-CM

## 2025-04-15 DIAGNOSIS — G35 MULTIPLE SCLEROSIS (HCC): ICD-10-CM

## 2025-04-16 RX ORDER — DIAZEPAM 5 MG/1
5 TABLET ORAL EVERY 6 HOURS PRN
Qty: 90 TABLET | Refills: 1 | Status: SHIPPED | OUTPATIENT
Start: 2025-04-16

## 2025-04-16 RX ORDER — TADALAFIL 20 MG/1
TABLET ORAL
Qty: 30 TABLET | Refills: 0 | Status: SHIPPED | OUTPATIENT
Start: 2025-04-16

## 2025-04-16 NOTE — TELEPHONE ENCOUNTER
A refill request was received for:  Requested Prescriptions     Pending Prescriptions Disp Refills    diazePAM 5 MG Oral Tab 90 tablet 1     Sig: Take 1 tablet (5 mg total) by mouth every 6 (six) hours as needed for Anxiety.    Tadalafil (CIALIS) 20 MG Oral Tab 30 tablet 0     Sig: TAKE 1 TABLET BY MOUTH DAILY AS NEEDED FOR ERECTILE DYSFUNCTION       Last refill date:2/2025 TADALAFIL  1/23/2025 1/23/2025       Last office visit: 2/19/2025    Follow up due:  No future appointments.

## 2025-07-16 DIAGNOSIS — G25.81 RESTLESS LEG: ICD-10-CM

## 2025-07-16 DIAGNOSIS — M62.838 MUSCLE SPASM: ICD-10-CM

## 2025-07-16 DIAGNOSIS — F32.5 MAJOR DEPRESSIVE DISORDER IN FULL REMISSION, UNSPECIFIED WHETHER RECURRENT: ICD-10-CM

## 2025-07-16 DIAGNOSIS — N52.9 ORGANIC ERECTILE DYSFUNCTION: ICD-10-CM

## 2025-07-16 DIAGNOSIS — G35 MULTIPLE SCLEROSIS (HCC): ICD-10-CM

## 2025-07-17 RX ORDER — DIAZEPAM 5 MG/1
5 TABLET ORAL EVERY 6 HOURS PRN
Qty: 90 TABLET | Refills: 1 | Status: SHIPPED | OUTPATIENT
Start: 2025-07-17

## 2025-07-17 RX ORDER — TADALAFIL 20 MG/1
TABLET ORAL
Qty: 30 TABLET | Refills: 0 | Status: SHIPPED | OUTPATIENT
Start: 2025-07-17

## 2025-07-17 RX ORDER — VENLAFAXINE HYDROCHLORIDE 150 MG/1
150 CAPSULE, EXTENDED RELEASE ORAL DAILY
Qty: 30 CAPSULE | Refills: 0 | Status: SHIPPED | OUTPATIENT
Start: 2025-07-17

## 2025-07-17 NOTE — TELEPHONE ENCOUNTER
.A refill request was received for:  Requested Prescriptions     Pending Prescriptions Disp Refills    tiZANidine 4 MG Oral Tab 30 tablet 2     Sig: Take 1 tablet (4 mg total) by mouth nightly.    venlafaxine  MG Oral Capsule SR 24 Hr 30 capsule 0     Sig: Take 1 capsule (150 mg total) by mouth daily.    diazePAM 5 MG Oral Tab 90 tablet 1     Sig: Take 1 tablet (5 mg total) by mouth every 6 (six) hours as needed for Anxiety.    Tadalafil (CIALIS) 20 MG Oral Tab 30 tablet 0     Sig: TAKE 1 TABLET BY MOUTH DAILY AS NEEDED FOR ERECTILE DYSFUNCTION       Last refill date:   tizanidine 8/13/24  Venlafaxine 3/17/25  Diazepam 4/16/25  Cialis 4/16/25      Last office visit:  2/19/25    Follow up due:  No future appointments.

## 2025-08-20 DIAGNOSIS — F32.5 MAJOR DEPRESSIVE DISORDER IN FULL REMISSION, UNSPECIFIED WHETHER RECURRENT: ICD-10-CM

## 2025-08-20 RX ORDER — VENLAFAXINE HYDROCHLORIDE 150 MG/1
150 CAPSULE, EXTENDED RELEASE ORAL DAILY
Qty: 30 CAPSULE | Refills: 0 | Status: SHIPPED | OUTPATIENT
Start: 2025-08-20

## (undated) DIAGNOSIS — G35 MULTIPLE SCLEROSIS (HCC): ICD-10-CM

## (undated) DIAGNOSIS — M62.838 MUSCLE SPASM: ICD-10-CM

## (undated) DIAGNOSIS — G25.81 RESTLESS LEG: ICD-10-CM

## (undated) DEVICE — SOLUTION  .9 1000ML BTL

## (undated) DEVICE — MEDI-VAC NON-CONDUCTIVE SUCTION TUBING 6MM X 1.8M (6FT.) L: Brand: CARDINAL HEALTH

## (undated) DEVICE — LASSO POLYPECTOMY SNARE: Brand: LASSO

## (undated) DEVICE — KIT ENDO ORCAPOD 160/180/190

## (undated) DEVICE — TRAY SURESTEP 16 BARDEX UMETR

## (undated) DEVICE — PAD SACRAL PREMIUM 12X12X1

## (undated) DEVICE — GENERAL LAPAROS CDS-LF: Brand: MEDLINE INDUSTRIES, INC.

## (undated) DEVICE — TROCAR: Brand: KII SHIELDED BLADED ACCESS SYSTEM

## (undated) DEVICE — KIT CLEAN ENDOKIT 1.1OZ GOWNX2

## (undated) DEVICE — SUTURE VICRYL 5-0 PC-1

## (undated) DEVICE — LIGHT HANDLE

## (undated) DEVICE — APPLICATOR CHLORAPREP 26ML

## (undated) DEVICE — ENDOPATH ULTRA VERESS INSUFFLATION NEEDLES WITH LUER LOCK CONNECTORS: Brand: ENDOPATH

## (undated) DEVICE — MONOFILAMENT ABSORBABLE SUTURE: Brand: MAXON

## (undated) DEVICE — 60 ML SYRINGE REGULAR TIP: Brand: MONOJECT

## (undated) DEVICE — Device: Brand: DUAL NARE NASAL CANNULAE FEMALE LUER CON 7FT O2 TUBE

## (undated) DEVICE — SPONGE STICK WITH PVP-I: Brand: KENDALL

## (undated) DEVICE — STERILE POLYISOPRENE POWDER-FREE SURGICAL GLOVES: Brand: PROTEXIS

## (undated) DEVICE — SNARE OPTMZ PLPCTM TRP

## (undated) DEVICE — VIOLET BRAIDED (POLYGLACTIN 910), SYNTHETIC ABSORBABLE SUTURE: Brand: COATED VICRYL

## (undated) DEVICE — SLEEVE KENDALL SCD EXPRESS MED

## (undated) DEVICE — 40580 - THE PINK PAD - ADVANCED TRENDELENBURG POSITIONING KIT: Brand: 40580 - THE PINK PAD - ADVANCED TRENDELENBURG POSITIONING KIT

## (undated) DEVICE — TROCAR: Brand: KII SLEEVE

## (undated) NOTE — LETTER
22    Patient: Surya Hannah  : 1980 Visit date: 2022    Dear  [place]    Thank you for referring Surya Hannah to my practice. Please find my assessment and plan below. Assessment   Unilateral inguinal hernia without obstruction or gangrene, recurrence not specified  (primary encounter diagnosis)  Umbilical hernia, incarcerated  Slow transit constipation  VITA (obstructive sleep apnea)      Plan   This patient presents with an umbilical hernia that is becoming significantly symptomatic. He gets 5/10 pain. It comes and goes. It is more noticeable with exercise and movements. He definitely sees a bulge. The pain is intermittent. It is described both as burning and stabbing. He does not have a specific event that brought about these problems. He has no difficulties with urinating. He does feel a change in bowel habits with some obstruction to defecation. He states he is constipated about half the time. He has no associated abdominal pain away from the hernia. He has no nausea or vomiting. He has had no developmental abnormalities of the genitalia. Both testes are present and descended. He has never had prostate cancer, prostate radiation, or any therapy to the prostate gland. He is not on any blood thinners. He has never had heart attack, stroke, heart murmur, heart valve replacement, or cardiac arrhythmia. He does have obstructive sleep apnea and wears a CPAP. The patient is disabled with multiple sclerosis for the last 14 years. He also has problems with cervical stenosis and L4-L5 back problems. It has been greater than 10 years since his last colonoscopy. Clinical exam reveals him to have a very large incarcerated umbilical hernia. The hernia sac is 4.5 cm, the internal opening is not palpable. Liver and spleen are not palpable. BMI is 32.98. There are no incisions on the abdomen. The umbilical hernia is tender on compression.   There is no guarding or rebound. There is no overlying cellulitis or erythema. Both testes are present and descended. There is a moderate sized right inguinal hernia. There is no left inguinal hernia. There are no extra cord structures. Examinations were performed both flat and standing with Valsalva. This patient will require colonoscopy based on his change in bowel habits with new onset of constipation. This patient will require a laparoscopic right inguinal herniorrhaphy with mesh. Repair of his incarcerated umbilical hernia. Had a full conversation with the patient in great detail about all the above. We discussed the possible exacerbation of MS with advanced surgical procedures. This would be a mild to moderate procedure, and is not usually associated with exacerbation of MS.         Sincerely,       Chris Nieves MD   CC: No Recipients

## (undated) NOTE — LETTER
[facility]  Administration of Anesthesia  I, Cordell Eason agree to be cared for by a physician anesthesiologist alone and/or with a nurse anesthetist, who is specially trained to monitor me and give me medicine to put me to sleep or keep me comfortable during my procedure    I understand that my anesthesiologist and/or anesthetist is not an employee or agent of [facility] or [employer]. He or she works for [facility]    As the patient asking for anesthesia services, I agree to:  Allow the anesthesiologist (anesthesia doctor) to give me medicine and do additional procedures as necessary. Some examples are: Starting or using an “IV” to give me medicine, fluids or blood during my procedure, and having a breathing tube placed to help me breathe when I’m asleep (intubation). In the event that my heart stops working properly, I understand that my anesthesiologist will make every effort to sustain my life, unless otherwise directed by [facility] Do Not Resuscitate documents.  Tell my anesthesia doctor before my procedure:  If I am pregnant.  The last time that I ate or drank.  iii. All of the medicines I take (including prescriptions, herbal supplements, and pills I can buy without a prescription (including street drugs/illegal medications). Failure to inform my anesthesiologist about these medicines may increase my risk of anesthetic complications.  iv.If I am allergic to anything or have had a reaction to anesthesia before.  I understand how the anesthesia medicine will help me (benefits).  I understand that with any type of anesthesia medicine there are risks:  The most common risks are: nausea, vomiting, sore throat, muscle soreness, damage to my eyes, mouth, or teeth (from breathing tube placement).  Rare risks include: remembering what happened during my procedure, allergic reactions to medications, injury to my airway, heart, lungs, vision, nerves, or  muscles and in extremely rare instances death.  My doctor has explained to me other choices available to me for my care (alternatives).  Pregnant Patients (“epidural”):  I understand that the risks of having an epidural (medicine given into my back to help control pain during labor), include itching, low blood pressure, difficulty urinating, headache or slowing of the baby’s heart. Very rare risks include infection, bleeding, seizure, irregular heart rhythms and nerve injury.  Regional Anesthesia (“spinal”, “epidural”, & “nerve blocks”):  I understand that rare but potential complications include headache, bleeding, infection, seizure, irregular heart rhythms, and nerve injury.    _____________________________________________________________________________  Patient (or Representative) Signature/Relationship to Patient  Date   Time    _____________________________________________________________________________   Name (if used)    Language/Organization   Time    _____________________________________________________________________________  Nurse Anesthetist Signature     Date   Time  _____________________________________________________________________________  Anesthesiologist Signature     Date   Time  I have discussed the procedure and information above with the patient (or patient’s representative) and answered their questions. The patient or their representative has agreed to have anesthesia services.    _____________________________________________________________________________  Witness        Date   Time  I have verified that the signature is that of the patient or patient’s representative, and that it was signed before the procedure  Patient Name: Cordell Eason     : 1980                 Printed: 3/6/2024 at 8:43 AM    Medical Record #: [id number]                                            Page 1 of 1  ----------ANESTHESIA CONSENT----------

## (undated) NOTE — LETTER
10/19/20        Shreyas 66      Dear Priscilla Gay records indicate that you have outstanding lab work and or testing that was ordered for you and has not yet been completed:  Orders Placed This Encounter      CBC W Differential W Platelet [E]      Comp Metabolic Panel (14) [E]      Lipid Panel [E]      Vitamin D, 25-Hydroxy [E]      TSH [E]      PSA (Screening) [E]    To provide you with the best possible care, please complete these orders at your earliest convenience. If you have recently completed these orders please disregard this letter. If you have any questions please call the office at Dept: [phone].      Thank you,       Yair Chavez, DO

## (undated) NOTE — MR AVS SNAPSHOT
[address]  [address] [facility], [address] [address] [phone]               Thank you for choosing us for your health care visit with Seth Friday, DO.   We are glad to serve you and happy to provide you with this medications prescribed for you. Read the directions carefully, and ask your doctor or other care provider to review them with you.          Where to Get Your Medications      These medications were sent to [id number], [address] [place] [address] active are less likely to develop some chronic diseases than adults who are inactive.      HOW TO GET STARTED: HOW TO STAY MOTIVATED:   Start activities slowly and build up over time Do what you like   Get your heart pumping – brisk walking, biking, swimmin

## (undated) NOTE — LETTER
10/31/19        Shreyas Colvin      Dear Graciela Lui records indicate that you have outstanding lab work and or testing that was ordered for you and has not yet been completed:  Orders Placed This Encounter      MEET Lino

## (undated) NOTE — LETTER
[place] [address], [place], [place]  Authorization for Surgical Operation and Procedure                                                                                           I hereby authorize Jian Carey MD, my physician and his/her assistants (if applicable), which may include medical students, residents, and/or fellows, to perform the following surgical operation/ procedure and administer such anesthesia as may be determined necessary by my physician: Operation/Procedure name (s) COLONOSCOPY on Cordell Eason   2.   I recognize that during the surgical operation/procedure, unforeseen conditions may necessitate additional or different procedures than those listed above.  I, therefore, further authorize and request that the above-named surgeon, assistants, or designees perform such procedures as are, in their judgment, necessary and desirable.    3.   My surgeon/physician has discussed prior to my surgery the potential benefits, risks and side effects of this procedure; the likelihood of achieving goals; and potential problems that might occur during recuperation.  They also discussed reasonable alternatives to the procedure, including risks, benefits, and side effects related to the alternatives and risks related to not receiving this procedure.  I have had all my questions answered and I acknowledge that no guarantee has been made as to the result that may be obtained.    4.   Should the need arise during my operation/procedure, which includes change of level of care prior to discharge, I also consent to the administration of blood and/or blood products.  Further, I understand that despite careful testing and screening of blood or blood products by collecting agencies, I may still be subject to ill effects as a result of receiving a blood transfusion and/or blood products.  The following are some, but not all, of the potential risks that can occur: fever and allergic  reactions, hemolytic reactions, transmission of diseases such as Hepatitis, AIDS and Cytomegalovirus (CMV) and fluid overload.  In the event that I wish to have an autologous transfusion of my own blood, or a directed donor transfusion, I will discuss this with my physician.  Check only if Refusing Blood or Blood Products  I understand refusal of blood or blood products as deemed necessary by my physician may have serious consequences to my condition to include possible death. I hereby assume responsibility for my refusal and release the hospital, its personnel, and my physicians from any responsibility for the consequences of my refusal.    o  Refuse   5.   I authorize the use of any specimen, organs, tissues, body parts or foreign objects that may be removed from my body during the operation/procedure for diagnosis, research or teaching purposes and their subsequent disposal by hospital authorities.  I also authorize the release of specimen test results and/or written reports to my treating physician on the hospital medical staff or other referring or consulting physicians involved in my care, at the discretion of the Pathologist or my treating physician.    6.   I consent to the photographing or videotaping of the operations or procedures to be performed, including appropriate portions of my body for medical, scientific, or educational purposes, provided my identity is not revealed by the pictures or by descriptive texts accompanying them.  If the procedure has been photographed/videotaped, the surgeon will obtain the original picture, image, videotape or CD.  The hospital will not be responsible for storage, release or maintenance of the picture, image, tape or CD.    7.   I consent to the presence of a  or observers in the operating room as deemed necessary by my physician or their designees.    8.   I recognize that in the event my procedure results in extended X-Ray/fluoroscopy time, I may  develop a skin reaction.    9. If I have a Do Not Attempt Resuscitation (DNAR) order in place, that status will be suspended while in the operating room, procedural suite, and during the recovery period unless otherwise explicitly stated by me (or a person authorized to consent on my behalf). The surgeon or my attending physician will determine when the applicable recovery period ends for purposes of reinstating the DNAR order.  10. Patients having a sterilization procedure: I understand that if the procedure is successful the results will be permanent and it will therefore be impossible for me to inseminate, conceive, or bear children.  I also understand that the procedure is intended to result in sterility, although the result has not been guaranteed.   11. I acknowledge that my physician has explained sedation/analgesia administration to me including the risk and benefits I consent to the administration of sedation/analgesia as may be necessary or desirable in the judgment of my physician.    I CERTIFY THAT I HAVE READ AND FULLY UNDERSTAND THE ABOVE CONSENT TO OPERATION and/or OTHER PROCEDURE.     _________________________________________ _________________________________     ___________________________________  Signature of Patient     Signature of Responsible Person                   Printed Name of Responsible Person                              _________________________________________ ______________________________        ___________________________________  Signature of Witness         Date  Time         Relationship to Patient    STATEMENT OF PHYSICIAN My signature below affirms that prior to the time of the procedure; I have explained to the patient and/or his/her legal representative, the risks and benefits involved in the proposed treatment and any reasonable alternative to the proposed treatment. I have also explained the risks and benefits involved in refusal of the proposed treatment and alternatives  to the proposed treatment and have answered the patient's questions. If I have a significant financial interest in a co-management agreement or a significant financial interest in any product or implant, or other significant relationship used in this procedure/surgery, I have disclosed this and had a discussion with my patient.     _______________________________________________________________ _____________________________  (Signature of Physician)                                                                                         (Date)                                   (Time)  Patient Name: Cordell Eason    : 1980   Printed: 3/6/2024      Medical Record #: [id number]                                              Page 1 of

## (undated) NOTE — LETTER
1/20/2025    Cordell Eason  [address]  [facility] [address]         Dear Cordell,    This letter is to inform you that our office has made several attempts to reach you by phone without success.  We were attempting to contact you by phone regarding the MyChart you have sent to Keerthi ADAMES on 12/30/2024 at the [facility].    Please contact our office at the number listed above as soon as you receive this letter to discuss this issue and to make the necessary changes in our system to your contact information.  Thank you for your cooperation.      [facility] ([facility])

## (undated) NOTE — LETTER
[facility], [facility], [place]  [address]   [facility] [address]  PH: [phone]  FAX: [phone]    25  Cordell Eason, :  1980  [address] [address]  Dear Cordell Eason,      Our records indicate that you are due for an appointment for a Colonoscopy with Jian Carey MD. Our doctors are booking out about 3-6 months in advance for procedures.     Please call our office to schedule this appointment.  Your medical well-being is important to us.    If your insurance requires a referral, please call your primary care office to request one.      Thank you,      The Physicians and Staff at [facility]

## (undated) NOTE — MR AVS SNAPSHOT
[address]  [address] [facility], [address] [address] [phone]               Thank you for choosing us for your health care visit with Rocio Glez DO.   We are glad to serve you and happy to provide you with this parra * Varenicline Tartrate 1 MG Tabs   Take 1 tablet (1 mg total) by mouth 2 (two) times daily. What changed:  Another medication with the same name was removed. Continue taking this medication, and follow the directions you see here.    Commonly known as:

## (undated) NOTE — LETTER
1/27/2025    Cordell Eason        [address]        [facility] [address]            Dear Cordell Eason,      Our records indicate that you are due for an appointment for a Colonoscopy with Jian Carey MD. Our doctors are booking out about 3-6 months in advance for procedures.     Please call our office to schedule this appointment.  Your medical well-being is important to us.    If your insurance requires a referral, please call your primary care office to request one.      Thank you,      The Physicians and Staff at [facility]